# Patient Record
Sex: FEMALE | Race: BLACK OR AFRICAN AMERICAN | NOT HISPANIC OR LATINO
[De-identification: names, ages, dates, MRNs, and addresses within clinical notes are randomized per-mention and may not be internally consistent; named-entity substitution may affect disease eponyms.]

---

## 2017-06-02 ENCOUNTER — LABORATORY RESULT (OUTPATIENT)
Age: 47
End: 2017-06-02

## 2017-06-02 ENCOUNTER — APPOINTMENT (OUTPATIENT)
Dept: INTERNAL MEDICINE | Facility: HOSPITAL | Age: 47
End: 2017-06-02

## 2017-06-02 ENCOUNTER — OUTPATIENT (OUTPATIENT)
Dept: OUTPATIENT SERVICES | Facility: HOSPITAL | Age: 47
LOS: 1 days | End: 2017-06-02

## 2017-06-02 VITALS
RESPIRATION RATE: 14 BRPM | SYSTOLIC BLOOD PRESSURE: 126 MMHG | TEMPERATURE: 97.9 F | HEART RATE: 115 BPM | DIASTOLIC BLOOD PRESSURE: 94 MMHG

## 2017-06-02 VITALS — WEIGHT: 293 LBS | BODY MASS INDEX: 44.41 KG/M2 | HEIGHT: 68 IN

## 2017-06-02 DIAGNOSIS — E11.9 TYPE 2 DIABETES MELLITUS W/OUT COMPLICATIONS: ICD-10-CM

## 2017-06-02 DIAGNOSIS — J32.9 CHRONIC SINUSITIS, UNSPECIFIED: ICD-10-CM

## 2017-06-02 LAB
ALBUMIN SERPL ELPH-MCNC: 4.2 G/DL — SIGNIFICANT CHANGE UP (ref 3.3–5)
ALP SERPL-CCNC: 78 U/L — SIGNIFICANT CHANGE UP (ref 40–120)
ALT FLD-CCNC: 14 U/L — SIGNIFICANT CHANGE UP (ref 4–33)
AST SERPL-CCNC: 13 U/L — SIGNIFICANT CHANGE UP (ref 4–32)
BASOPHILS # BLD AUTO: 0.02 K/UL — SIGNIFICANT CHANGE UP (ref 0–0.2)
BASOPHILS NFR BLD AUTO: 0.2 % — SIGNIFICANT CHANGE UP (ref 0–2)
BILIRUB SERPL-MCNC: 0.3 MG/DL — SIGNIFICANT CHANGE UP (ref 0.2–1.2)
BUN SERPL-MCNC: 8 MG/DL — SIGNIFICANT CHANGE UP (ref 7–23)
CALCIUM SERPL-MCNC: 9.3 MG/DL — SIGNIFICANT CHANGE UP (ref 8.4–10.5)
CHLORIDE SERPL-SCNC: 96 MMOL/L — LOW (ref 98–107)
CHOLEST SERPL-MCNC: 213 MG/DL — HIGH (ref 120–199)
CO2 SERPL-SCNC: 25 MMOL/L — SIGNIFICANT CHANGE UP (ref 22–31)
CREAT SERPL-MCNC: 0.8 MG/DL — SIGNIFICANT CHANGE UP (ref 0.5–1.3)
EOSINOPHIL # BLD AUTO: 0.2 K/UL — SIGNIFICANT CHANGE UP (ref 0–0.5)
EOSINOPHIL NFR BLD AUTO: 1.6 % — SIGNIFICANT CHANGE UP (ref 0–6)
GLUCOSE SERPL-MCNC: 155 MG/DL — HIGH (ref 70–99)
HBA1C BLD-MCNC: 7.3 % — HIGH (ref 4–5.6)
HCT VFR BLD CALC: 37 % — SIGNIFICANT CHANGE UP (ref 34.5–45)
HDLC SERPL-MCNC: 52 MG/DL — SIGNIFICANT CHANGE UP (ref 45–65)
HGB BLD-MCNC: 11.6 G/DL — SIGNIFICANT CHANGE UP (ref 11.5–15.5)
IMM GRANULOCYTES NFR BLD AUTO: 0.3 % — SIGNIFICANT CHANGE UP (ref 0–1.5)
LIPID PNL WITH DIRECT LDL SERPL: 135 MG/DL — SIGNIFICANT CHANGE UP
LYMPHOCYTES # BLD AUTO: 29.4 % — SIGNIFICANT CHANGE UP (ref 13–44)
LYMPHOCYTES # BLD AUTO: 3.59 K/UL — HIGH (ref 1–3.3)
MCHC RBC-ENTMCNC: 25.8 PG — LOW (ref 27–34)
MCHC RBC-ENTMCNC: 31.4 % — LOW (ref 32–36)
MCV RBC AUTO: 82.4 FL — SIGNIFICANT CHANGE UP (ref 80–100)
MONOCYTES # BLD AUTO: 0.55 K/UL — SIGNIFICANT CHANGE UP (ref 0–0.9)
MONOCYTES NFR BLD AUTO: 4.5 % — SIGNIFICANT CHANGE UP (ref 2–14)
NEUTROPHILS # BLD AUTO: 7.8 K/UL — HIGH (ref 1.8–7.4)
NEUTROPHILS NFR BLD AUTO: 64 % — SIGNIFICANT CHANGE UP (ref 43–77)
PLATELET # BLD AUTO: 419 K/UL — HIGH (ref 150–400)
PMV BLD: 9.4 FL — SIGNIFICANT CHANGE UP (ref 7–13)
POTASSIUM SERPL-MCNC: 3.6 MMOL/L — SIGNIFICANT CHANGE UP (ref 3.5–5.3)
POTASSIUM SERPL-SCNC: 3.6 MMOL/L — SIGNIFICANT CHANGE UP (ref 3.5–5.3)
PROT SERPL-MCNC: 8.4 G/DL — HIGH (ref 6–8.3)
RBC # BLD: 4.49 M/UL — SIGNIFICANT CHANGE UP (ref 3.8–5.2)
RBC # FLD: 14.6 % — HIGH (ref 10.3–14.5)
SODIUM SERPL-SCNC: 138 MMOL/L — SIGNIFICANT CHANGE UP (ref 135–145)
TRIGL SERPL-MCNC: 111 MG/DL — SIGNIFICANT CHANGE UP (ref 10–149)
TSH SERPL-MCNC: 0.89 UIU/ML — SIGNIFICANT CHANGE UP (ref 0.27–4.2)
WBC # BLD: 12.2 K/UL — HIGH (ref 3.8–10.5)
WBC # FLD AUTO: 12.2 K/UL — HIGH (ref 3.8–10.5)

## 2017-06-05 DIAGNOSIS — J32.9 CHRONIC SINUSITIS, UNSPECIFIED: ICD-10-CM

## 2017-06-05 DIAGNOSIS — E11.9 TYPE 2 DIABETES MELLITUS WITHOUT COMPLICATIONS: ICD-10-CM

## 2017-06-05 DIAGNOSIS — I10 ESSENTIAL (PRIMARY) HYPERTENSION: ICD-10-CM

## 2017-06-05 DIAGNOSIS — T78.3XXA ANGIONEUROTIC EDEMA, INITIAL ENCOUNTER: ICD-10-CM

## 2017-06-06 DIAGNOSIS — Z00.00 ENCOUNTER FOR GENERAL ADULT MEDICAL EXAMINATION WITHOUT ABNORMAL FINDINGS: ICD-10-CM

## 2017-06-06 LAB
HGB A MFR BLD: 97.4 % — SIGNIFICANT CHANGE UP
HGB A2 MFR BLD: 2.4 % — SIGNIFICANT CHANGE UP (ref 2.4–3.5)
HGB ELECT COMMENT: SIGNIFICANT CHANGE UP
HGB F MFR BLD: < 1 % — SIGNIFICANT CHANGE UP (ref 0–1.5)

## 2017-06-07 LAB — GLUCOSE BLDC GLUCOMTR-MCNC: 100

## 2017-06-20 ENCOUNTER — APPOINTMENT (OUTPATIENT)
Dept: INTERNAL MEDICINE | Facility: HOSPITAL | Age: 47
End: 2017-06-20

## 2017-07-18 ENCOUNTER — RX RENEWAL (OUTPATIENT)
Age: 47
End: 2017-07-18

## 2017-07-24 ENCOUNTER — APPOINTMENT (OUTPATIENT)
Dept: OBGYN | Facility: HOSPITAL | Age: 47
End: 2017-07-24

## 2017-08-16 ENCOUNTER — APPOINTMENT (OUTPATIENT)
Dept: OBGYN | Facility: HOSPITAL | Age: 47
End: 2017-08-16

## 2017-08-29 ENCOUNTER — OTHER (OUTPATIENT)
Age: 47
End: 2017-08-29

## 2017-08-30 RX ORDER — METFORMIN HYDROCHLORIDE 500 MG/1
500 TABLET, COATED ORAL TWICE DAILY
Qty: 180 | Refills: 2 | Status: ACTIVE | COMMUNITY
Start: 2017-06-02 | End: 1900-01-01

## 2017-10-03 ENCOUNTER — APPOINTMENT (OUTPATIENT)
Dept: INTERNAL MEDICINE | Facility: HOSPITAL | Age: 47
End: 2017-10-03

## 2017-10-04 ENCOUNTER — APPOINTMENT (OUTPATIENT)
Dept: OBGYN | Facility: HOSPITAL | Age: 47
End: 2017-10-04

## 2017-10-19 ENCOUNTER — APPOINTMENT (OUTPATIENT)
Dept: OBGYN | Facility: HOSPITAL | Age: 47
End: 2017-10-19

## 2017-11-08 ENCOUNTER — APPOINTMENT (OUTPATIENT)
Dept: OPHTHALMOLOGY | Facility: CLINIC | Age: 47
End: 2017-11-08

## 2017-11-09 ENCOUNTER — APPOINTMENT (OUTPATIENT)
Dept: OBGYN | Facility: HOSPITAL | Age: 47
End: 2017-11-09

## 2017-11-17 ENCOUNTER — APPOINTMENT (OUTPATIENT)
Dept: MAMMOGRAPHY | Facility: IMAGING CENTER | Age: 47
End: 2017-11-17
Payer: MEDICAID

## 2017-11-17 ENCOUNTER — OUTPATIENT (OUTPATIENT)
Dept: OUTPATIENT SERVICES | Facility: HOSPITAL | Age: 47
LOS: 1 days | End: 2017-11-17
Payer: COMMERCIAL

## 2017-11-17 DIAGNOSIS — Z00.8 ENCOUNTER FOR OTHER GENERAL EXAMINATION: ICD-10-CM

## 2017-11-17 PROCEDURE — 77063 BREAST TOMOSYNTHESIS BI: CPT | Mod: 26

## 2017-11-17 PROCEDURE — 77063 BREAST TOMOSYNTHESIS BI: CPT

## 2017-11-17 PROCEDURE — 77067 SCR MAMMO BI INCL CAD: CPT

## 2017-11-17 PROCEDURE — G0202: CPT | Mod: 26

## 2017-12-07 ENCOUNTER — APPOINTMENT (OUTPATIENT)
Dept: OBGYN | Facility: HOSPITAL | Age: 47
End: 2017-12-07

## 2018-01-10 ENCOUNTER — APPOINTMENT (OUTPATIENT)
Dept: OPHTHALMOLOGY | Facility: CLINIC | Age: 48
End: 2018-01-10

## 2019-07-06 ENCOUNTER — APPOINTMENT (OUTPATIENT)
Dept: MAMMOGRAPHY | Facility: IMAGING CENTER | Age: 49
End: 2019-07-06

## 2019-10-19 ENCOUNTER — APPOINTMENT (OUTPATIENT)
Dept: MAMMOGRAPHY | Facility: IMAGING CENTER | Age: 49
End: 2019-10-19
Payer: COMMERCIAL

## 2019-10-19 ENCOUNTER — OUTPATIENT (OUTPATIENT)
Dept: OUTPATIENT SERVICES | Facility: HOSPITAL | Age: 49
LOS: 1 days | End: 2019-10-19
Payer: COMMERCIAL

## 2019-10-19 DIAGNOSIS — Z00.8 ENCOUNTER FOR OTHER GENERAL EXAMINATION: ICD-10-CM

## 2019-10-19 PROCEDURE — 77067 SCR MAMMO BI INCL CAD: CPT | Mod: 26

## 2019-10-19 PROCEDURE — 77067 SCR MAMMO BI INCL CAD: CPT

## 2019-10-19 PROCEDURE — 77063 BREAST TOMOSYNTHESIS BI: CPT

## 2019-10-19 PROCEDURE — 77063 BREAST TOMOSYNTHESIS BI: CPT | Mod: 26

## 2021-04-24 ENCOUNTER — APPOINTMENT (OUTPATIENT)
Dept: MAMMOGRAPHY | Facility: IMAGING CENTER | Age: 51
End: 2021-04-24
Payer: COMMERCIAL

## 2021-04-24 ENCOUNTER — OUTPATIENT (OUTPATIENT)
Dept: OUTPATIENT SERVICES | Facility: HOSPITAL | Age: 51
LOS: 1 days | End: 2021-04-24
Payer: COMMERCIAL

## 2021-04-24 DIAGNOSIS — Z00.8 ENCOUNTER FOR OTHER GENERAL EXAMINATION: ICD-10-CM

## 2021-04-24 PROCEDURE — 77067 SCR MAMMO BI INCL CAD: CPT

## 2021-04-24 PROCEDURE — 77063 BREAST TOMOSYNTHESIS BI: CPT

## 2021-04-24 PROCEDURE — 77063 BREAST TOMOSYNTHESIS BI: CPT | Mod: 26

## 2021-04-24 PROCEDURE — 77067 SCR MAMMO BI INCL CAD: CPT | Mod: 26

## 2021-04-30 ENCOUNTER — RESULT REVIEW (OUTPATIENT)
Age: 51
End: 2021-04-30

## 2021-05-21 ENCOUNTER — APPOINTMENT (OUTPATIENT)
Dept: ULTRASOUND IMAGING | Facility: IMAGING CENTER | Age: 51
End: 2021-05-21

## 2021-05-22 ENCOUNTER — APPOINTMENT (OUTPATIENT)
Dept: ULTRASOUND IMAGING | Facility: IMAGING CENTER | Age: 51
End: 2021-05-22
Payer: COMMERCIAL

## 2021-05-22 ENCOUNTER — OUTPATIENT (OUTPATIENT)
Dept: OUTPATIENT SERVICES | Facility: HOSPITAL | Age: 51
LOS: 1 days | End: 2021-05-22
Payer: COMMERCIAL

## 2021-05-22 DIAGNOSIS — Z00.8 ENCOUNTER FOR OTHER GENERAL EXAMINATION: ICD-10-CM

## 2021-05-22 PROCEDURE — 76830 TRANSVAGINAL US NON-OB: CPT

## 2021-05-22 PROCEDURE — 76830 TRANSVAGINAL US NON-OB: CPT | Mod: 26

## 2022-01-05 ENCOUNTER — APPOINTMENT (OUTPATIENT)
Dept: PEDIATRIC ALLERGY IMMUNOLOGY | Facility: CLINIC | Age: 52
End: 2022-01-05
Payer: COMMERCIAL

## 2022-01-05 VITALS
HEART RATE: 76 BPM | DIASTOLIC BLOOD PRESSURE: 85 MMHG | OXYGEN SATURATION: 98 % | HEIGHT: 68 IN | SYSTOLIC BLOOD PRESSURE: 143 MMHG | WEIGHT: 293 LBS | BODY MASS INDEX: 44.41 KG/M2

## 2022-01-05 DIAGNOSIS — T78.3XXA ANGIONEUROTIC EDEMA, INITIAL ENCOUNTER: ICD-10-CM

## 2022-01-05 DIAGNOSIS — I10 ESSENTIAL (PRIMARY) HYPERTENSION: ICD-10-CM

## 2022-01-05 PROCEDURE — 36415 COLL VENOUS BLD VENIPUNCTURE: CPT | Mod: GC

## 2022-01-05 PROCEDURE — 99204 OFFICE O/P NEW MOD 45 MIN: CPT | Mod: 25,GC

## 2022-01-05 RX ORDER — EPINEPHRINE 0.3 MG/.3ML
0.3 INJECTION INTRAMUSCULAR
Qty: 2 | Refills: 0 | Status: ACTIVE | COMMUNITY
Start: 2022-01-05 | End: 1900-01-01

## 2022-01-05 RX ORDER — CETIRIZINE HYDROCHLORIDE 10 MG/1
10 TABLET, COATED ORAL
Qty: 60 | Refills: 5 | Status: ACTIVE | COMMUNITY
Start: 2022-01-05 | End: 1900-01-01

## 2022-01-06 LAB
ALBUMIN SERPL ELPH-MCNC: 4.7 G/DL
ALP BLD-CCNC: 128 U/L
ALT SERPL-CCNC: 25 U/L
ANION GAP SERPL CALC-SCNC: 15 MMOL/L
AST SERPL-CCNC: 13 U/L
BASOPHILS # BLD AUTO: 0.03 K/UL
BASOPHILS NFR BLD AUTO: 0.2 %
BILIRUB SERPL-MCNC: 0.3 MG/DL
BUN SERPL-MCNC: 10 MG/DL
CALCIUM SERPL-MCNC: 10 MG/DL
CHLORIDE SERPL-SCNC: 98 MMOL/L
CO2 SERPL-SCNC: 26 MMOL/L
CREAT SERPL-MCNC: 0.64 MG/DL
EOSINOPHIL # BLD AUTO: 0.12 K/UL
EOSINOPHIL NFR BLD AUTO: 0.9 %
ERYTHROCYTE [SEDIMENTATION RATE] IN BLOOD BY WESTERGREN METHOD: 38 MM/HR
GLUCOSE SERPL-MCNC: 108 MG/DL
HCT VFR BLD CALC: 43.4 %
HGB BLD-MCNC: 13.1 G/DL
IMM GRANULOCYTES NFR BLD AUTO: 0.3 %
LYMPHOCYTES # BLD AUTO: 3.62 K/UL
LYMPHOCYTES NFR BLD AUTO: 26.8 %
MAN DIFF?: NORMAL
MCHC RBC-ENTMCNC: 26 PG
MCHC RBC-ENTMCNC: 30.2 GM/DL
MCV RBC AUTO: 86.3 FL
MONOCYTES # BLD AUTO: 0.77 K/UL
MONOCYTES NFR BLD AUTO: 5.7 %
NEUTROPHILS # BLD AUTO: 8.95 K/UL
NEUTROPHILS NFR BLD AUTO: 66.1 %
PLATELET # BLD AUTO: 388 K/UL
POTASSIUM SERPL-SCNC: 3.8 MMOL/L
PROT SERPL-MCNC: 8.2 G/DL
RBC # BLD: 5.03 M/UL
RBC # FLD: 14.5 %
SODIUM SERPL-SCNC: 139 MMOL/L
T4 FREE SERPL-MCNC: 1.2 NG/DL
TSH SERPL-ACNC: 1.02 UIU/ML
WBC # FLD AUTO: 13.53 K/UL

## 2022-01-10 ENCOUNTER — LABORATORY RESULT (OUTPATIENT)
Age: 52
End: 2022-01-10

## 2022-01-21 LAB
C1INH FUNCTIONAL FLD-MCNC: 89
C1INH SERPL-MCNC: 40 MG/DL
C4 SERPL-MCNC: 40 MG/DL
CHRONIC URTICARIA PANEL (CU INDEX): 5
THYROGLOB AB SERPL-ACNC: 45.6 IU/ML
THYROPEROXIDASE AB SERPL IA-ACNC: 453 IU/ML
TRYPTASE: 3.7 UG/L
TSH RECEPTOR AB: <1.1 IU/L

## 2022-01-23 PROBLEM — T78.3XXA ANGIOEDEMA: Status: ACTIVE | Noted: 2017-06-02

## 2022-01-23 NOTE — PHYSICAL EXAM
[Alert] : alert [Well Nourished] : well nourished Depth Of Biopsy: dermis [Healthy Appearance] : healthy appearance [No Acute Distress] : no acute distress [Well Developed] : well developed [Normal Pupil & Iris Size/Symmetry] : normal pupil and iris size and symmetry [No Discharge] : no discharge [No Photophobia] : no photophobia [Sclera Not Icteric] : sclera not icteric [Normal TMs] : both tympanic membranes were normal [Normal Nasal Mucosa] : the nasal mucosa was normal [Normal Lips/Tongue] : the lips and tongue were normal [Normal Outer Ear/Nose] : the ears and nose were normal in appearance [Normal Tonsils] : normal tonsils [No Thrush] : no thrush [Supple] : the neck was supple [Normal Rate and Effort] : normal respiratory rhythm and effort [No Crackles] : no crackles [No Retractions] : no retractions [Bilateral Audible Breath Sounds] : bilateral audible breath sounds [Normal Rate] : heart rate was normal  [Normal S1, S2] : normal S1 and S2 [No murmur] : no murmur [Regular Rhythm] : with a regular rhythm [Soft] : abdomen soft [Not Tender] : non-tender [Not Distended] : not distended [No HSM] : no hepato-splenomegaly [Normal Cervical Lymph Nodes] : cervical [Skin Intact] : skin intact  [No Rash] : no rash [No Skin Lesions] : no skin lesions [No clubbing] : no clubbing [No Edema] : no edema [No Cyanosis] : no cyanosis [Normal Mood] : mood was normal [Normal Affect] : affect was normal [Alert, Awake, Oriented as Age-Appropriate] : alert, awake, oriented as age appropriate [Pale mucosa] : no pale mucosa [de-identified] : obese

## 2022-01-23 NOTE — HISTORY OF PRESENT ILLNESS
[de-identified] : Beto is a 51 year old woman with urticaria and angioedema who presents for initial allergy evaluation. \par \par Hx of asthma\par She has taken albuterol and inhaled steroid\par \par Hx of chronic urticaria and angioedema \par Episodes of urticaria and angioedema for the past 3 years - has an epipen.\shelton USed epipen a few night ago for diffuse hives, weakness, difficulty breathing, and lip swelling, which helped. Went to ED last Friday where they sent her home with prednisone to manage angioedema.\shelton Also had hives and had chest tightness. Hives worsen with itching\par No known pattern or triggers for the episodes, will wake up in the middle of the night with tongue swelling\shelton Does not get asthma symptoms unless she has urticaria and angioedema. Takes 1 zyrtec daily.\shelton Also has near-syncope "all the time", has blacked out 3x. Also frequently has hot flashes and headaches but was evaluated by her OB/GYN, who said that she is in menopause. Is up to date on her mammogram screenings and pap smears, all have been normal thus far. Has NOT yet had a colonoscopy.\par Other medical problems include diabetes, HTN, hyperlipidemia. \par Possible rheumatoid arthritis in her sister but no other known autoimmune disorders in family history.\par Medications include: metformin, farxiga, atorvastatin, amlodipine, zyrtec, aspirin (supposed to take this per her PCP but has not been taking) \par \par Dairy and chocolate tend to exacerbate onset of symptoms but will happen in the absence of these as well

## 2022-01-23 NOTE — CONSULT LETTER
[Dear  ___] : Dear  [unfilled], [Consult Letter:] : I had the pleasure of evaluating your patient, [unfilled]. [Please see my note below.] : Please see my note below. [Consult Closing:] : Thank you very much for allowing me to participate in the care of this patient.  If you have any questions, please do not hesitate to contact me. [Sincerely,] : Sincerely, [FreeTextEntry2] : Maykel Levine MD [FreeTextEntry3] : Lucia Ordonez MD\par Attending Physician \par Division of Allergy/Immunology \par [facility] \par \par  of Medicine and Pediatrics\par [facility] \par \par [address]\[place] [address]\par Tel: [phone]\par Fax: [phone]\par Email: [email or web address]\par \par \par \par

## 2022-01-28 LAB — IGE RECEPTOR AB: NORMAL

## 2022-03-14 ENCOUNTER — APPOINTMENT (OUTPATIENT)
Dept: VASCULAR SURGERY | Facility: CLINIC | Age: 52
End: 2022-03-14
Payer: COMMERCIAL

## 2022-03-14 VITALS
SYSTOLIC BLOOD PRESSURE: 138 MMHG | WEIGHT: 293 LBS | HEART RATE: 73 BPM | HEIGHT: 68 IN | BODY MASS INDEX: 44.41 KG/M2 | TEMPERATURE: 96.8 F | DIASTOLIC BLOOD PRESSURE: 86 MMHG

## 2022-03-14 PROCEDURE — 93922 UPR/L XTREMITY ART 2 LEVELS: CPT

## 2022-03-14 PROCEDURE — 99203 OFFICE O/P NEW LOW 30 MIN: CPT | Mod: 25

## 2022-03-14 PROCEDURE — 29580 STRAPPING UNNA BOOT: CPT | Mod: LT

## 2022-03-14 PROCEDURE — 93970 EXTREMITY STUDY: CPT

## 2022-03-17 NOTE — HISTORY OF PRESENT ILLNESS
[FreeTextEntry1] : Mrs. Lui Hurst is a jammie 51 year old lady who presents with a 1 month history of left medial ankle wound. She reports a history of a wound in the same location, which occurred following injury in 2007. The wound took two years to heal completely. This was managed with Unna boots at the time. She has been applying Unna boots herself once/week. She denies any history of DVT or SVT. She reports cramping in the left leg which occurs after ambulating 1-2 blocks. She reports pain in the leg at night. She denies any history of right sided symptoms. She has previously worn compression stockings. She has not had any prior vascular surgical intervention on her lower extremities. 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

## 2022-03-17 NOTE — PHYSICAL EXAM
[Normal Breath Sounds] : Normal breath sounds [Normal Heart Sounds] : normal heart sounds [2+] : left 2+ [de-identified] : NAD. Neurologically intact.  [de-identified] : WNL. [FreeTextEntry1] : Left medial leg ulcer 5x3cm. Clean, no signs of infection.

## 2022-03-21 ENCOUNTER — APPOINTMENT (OUTPATIENT)
Dept: VASCULAR SURGERY | Facility: CLINIC | Age: 52
End: 2022-03-21
Payer: COMMERCIAL

## 2022-03-21 VITALS
DIASTOLIC BLOOD PRESSURE: 82 MMHG | HEART RATE: 81 BPM | SYSTOLIC BLOOD PRESSURE: 129 MMHG | WEIGHT: 293 LBS | BODY MASS INDEX: 43.4 KG/M2 | TEMPERATURE: 95.7 F | HEIGHT: 69 IN

## 2022-03-21 PROCEDURE — 29580 STRAPPING UNNA BOOT: CPT

## 2022-03-21 PROCEDURE — 99213 OFFICE O/P EST LOW 20 MIN: CPT | Mod: 25

## 2022-03-21 NOTE — ASSESSMENT
[FreeTextEntry1] : In summary, Mrs. Lui Hurst presents with left medial ankle ulcer. The unna boot dressing has moisture through the dressing, therefore we will change to twice weekly dressing changes (first one performed today). Fibrinous tissue was debrided from wound prior to dressing change. She will return on 3/24 for dressing change with our NP Julissa and on 3/28 for dressing change with me.

## 2022-03-21 NOTE — PHYSICAL EXAM
[Normal Breath Sounds] : Normal breath sounds [Normal Heart Sounds] : normal heart sounds [2+] : left 2+ [de-identified] : NAD. Neurologically intact.  [de-identified] : WNL. [FreeTextEntry1] : Left medial leg ulcer 5x3cm. Clean, no signs of infection. More granulation tissue present today.

## 2022-03-21 NOTE — HISTORY OF PRESENT ILLNESS
[FreeTextEntry1] : Mrs. Lui Hurst is a jammie 51 year old lady who presents with a 1 month history of left medial ankle wound. She reports a history of a wound in the same location, which occurred following injury in 2007. The wound took two years to heal completely. This was managed with Unna boots at the time. She has been applying Unna boots herself once/week. She denies any history of DVT or SVT. She reports cramping in the left leg which occurs after ambulating 1-2 blocks. She reports pain in the leg at night. She denies any history of right sided symptoms. She has previously worn compression stockings. She has not had any prior vascular surgical intervention on her lower extremities. 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

## 2022-03-24 ENCOUNTER — APPOINTMENT (OUTPATIENT)
Dept: VASCULAR SURGERY | Facility: CLINIC | Age: 52
End: 2022-03-24
Payer: COMMERCIAL

## 2022-03-24 PROCEDURE — 29580 STRAPPING UNNA BOOT: CPT | Mod: LT

## 2022-03-24 RX ORDER — ATORVASTATIN CALCIUM 20 MG/1
20 TABLET, FILM COATED ORAL
Qty: 90 | Refills: 0 | Status: ACTIVE | COMMUNITY
Start: 2021-04-09

## 2022-03-24 RX ORDER — ALBUTEROL SULFATE 90 UG/1
108 (90 BASE) INHALANT RESPIRATORY (INHALATION)
Qty: 7 | Refills: 0 | Status: ACTIVE | COMMUNITY
Start: 2021-04-17

## 2022-03-24 RX ORDER — FAMOTIDINE 20 MG/1
20 TABLET, FILM COATED ORAL
Qty: 60 | Refills: 0 | Status: ACTIVE | COMMUNITY
Start: 2021-11-09

## 2022-03-24 RX ORDER — AMOXICILLIN AND CLAVULANATE POTASSIUM 875; 125 MG/1; MG/1
875-125 TABLET, COATED ORAL
Qty: 14 | Refills: 0 | Status: ACTIVE | COMMUNITY
Start: 2022-02-22 | End: 1900-01-01

## 2022-03-24 RX ORDER — SULFAMETHOXAZOLE AND TRIMETHOPRIM 800; 160 MG/1; MG/1
800-160 TABLET ORAL
Qty: 14 | Refills: 0 | Status: ACTIVE | COMMUNITY
Start: 2022-02-18

## 2022-03-24 RX ORDER — DAPAGLIFLOZIN 5 MG/1
5 TABLET, FILM COATED ORAL
Qty: 90 | Refills: 0 | Status: ACTIVE | COMMUNITY
Start: 2021-09-16

## 2022-03-24 RX ORDER — HYDRALAZINE HYDROCHLORIDE 10 MG/1
10 TABLET ORAL
Qty: 90 | Refills: 0 | Status: ACTIVE | COMMUNITY
Start: 2022-03-17

## 2022-03-24 RX ORDER — PREDNISONE 20 MG/1
20 TABLET ORAL
Qty: 60 | Refills: 0 | Status: ACTIVE | COMMUNITY
Start: 2021-11-09

## 2022-03-24 RX ORDER — DULAGLUTIDE 1.5 MG/.5ML
1.5 INJECTION, SOLUTION SUBCUTANEOUS
Qty: 6 | Refills: 0 | Status: ACTIVE | COMMUNITY
Start: 2022-02-22

## 2022-03-24 RX ORDER — DULAGLUTIDE 0.75 MG/.5ML
0.75 INJECTION, SOLUTION SUBCUTANEOUS
Qty: 2 | Refills: 0 | Status: ACTIVE | COMMUNITY
Start: 2022-02-07

## 2022-03-24 NOTE — PHYSICAL EXAM
[Normal Breath Sounds] : Normal breath sounds [Normal Heart Sounds] : normal heart sounds [] : present [2+] : left 2+ [Ankle Swelling On The Left] : moderate [Alert] : alert [Oriented to Person] : oriented to person [Oriented to Place] : oriented to place [Oriented to Time] : oriented to time [Calm] : calm [de-identified] : NAD [de-identified] : WNL. [FreeTextEntry1] : Left medial leg ulcer 5cm x 4cm w/ granulation tissue. Two small areas w/ fibrin firmly attached to base. Mild serous drainage. Surrounding periwound area w/ erythema and warmth to the touch.  [de-identified] : central obesity

## 2022-03-24 NOTE — ASSESSMENT
[Arterial/Venous Disease] : arterial/venous disease [Other: _____] : [unfilled] [Foot care/Footwear] : foot care/footwear [Ulcer Care] : ulcer care [FreeTextEntry1] : Venous Insufficiency w/ left medial malleolar ulcer w/ mild cellulitis\par \par Medical Conservative Management leg elevation, encouraged diet and weight loss, compression stockings to right leg\par Start Augmentin 875/125mg PO BID x 7 days\par Continue left leg unna boot 2x week until wound completely heals\par RTO on Monday for unna boot change

## 2022-03-24 NOTE — HISTORY OF PRESENT ILLNESS
[FreeTextEntry1] :  50 y/o f who presents with a 1 month history of left medial ankle wound. She also had a wound in the same location years ago which eventually healed. She is tolerating a left leg unna boot and last visit the frequency of changes was increases to 2x week d/t drainage. She denies any history of DVT or SVT. She reports pain in the leg at night w/ associated burning. Taking ibuprofen. Denies fever or chills.

## 2022-03-24 NOTE — PROCEDURE
[FreeTextEntry1] : Left leg wound cleansed w/ Chlorhexidine solution\par Left unna boot re applied from toes to below knee, foam used for absorbency outside the unna boot

## 2022-03-28 ENCOUNTER — APPOINTMENT (OUTPATIENT)
Dept: VASCULAR SURGERY | Facility: CLINIC | Age: 52
End: 2022-03-28
Payer: COMMERCIAL

## 2022-03-28 VITALS
HEART RATE: 69 BPM | BODY MASS INDEX: 30.36 KG/M2 | WEIGHT: 205 LBS | SYSTOLIC BLOOD PRESSURE: 135 MMHG | TEMPERATURE: 97.3 F | HEIGHT: 69 IN | DIASTOLIC BLOOD PRESSURE: 76 MMHG

## 2022-03-28 PROCEDURE — 29580 STRAPPING UNNA BOOT: CPT | Mod: LT

## 2022-03-29 NOTE — ASSESSMENT
[FreeTextEntry1] : In summary, Mrs. Lui Hurst presents for left leg unna boot dressing change, which was performed. She will follow up at the end of the week for the next dressing change.

## 2022-03-29 NOTE — PHYSICAL EXAM
[Normal Breath Sounds] : Normal breath sounds [Normal Heart Sounds] : normal heart sounds [2+] : left 2+ [de-identified] : NAD. Neurologically intact.  [de-identified] : WNL. [FreeTextEntry1] : Left medial leg ulcer 5x3cm. Clean, no signs of infection. More granulation tissue present today.

## 2022-03-29 NOTE — HISTORY OF PRESENT ILLNESS
[FreeTextEntry1] : Mrs. Lui Hurst is a jammie 51 year old lady who presented with a 1 month history of left medial ankle wound. She reported a history of a wound in the same location, which occurred following injury in 2007. The wound took two years to heal completely. This was managed with Unna boots at the time. She had been applying Unna boots herself once/week. She denied any history of DVT or SVT. She reported cramping in the left leg which occurs after ambulating 1-2 blocks. She reported pain in the leg at night. She denied any history of right sided symptoms. She had previously worn compression stockings. She has not had any prior vascular surgical intervention on her lower extremities. Following her initial evaluation, we initiated twice weekly Unna boot dressings and she presents today for dressing change. 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

## 2022-03-31 ENCOUNTER — APPOINTMENT (OUTPATIENT)
Dept: VASCULAR SURGERY | Facility: CLINIC | Age: 52
End: 2022-03-31
Payer: COMMERCIAL

## 2022-03-31 VITALS
HEIGHT: 69 IN | BODY MASS INDEX: 43.4 KG/M2 | SYSTOLIC BLOOD PRESSURE: 138 MMHG | DIASTOLIC BLOOD PRESSURE: 83 MMHG | WEIGHT: 293 LBS | HEART RATE: 82 BPM | TEMPERATURE: 98.3 F

## 2022-03-31 PROCEDURE — 29580 STRAPPING UNNA BOOT: CPT | Mod: LT

## 2022-03-31 NOTE — ASSESSMENT
[Arterial/Venous Disease] : arterial/venous disease [Other: _____] : [unfilled] [Foot care/Footwear] : foot care/footwear [Ulcer Care] : ulcer care [FreeTextEntry1] : Venous Insufficiency w/ left medial malleolar ulcer w/ cellulitis resolved\par \par Medical Conservative Management leg elevation, encouraged diet and weight loss, compression stockings to right leg\par Continue left leg unna boot 2x week until wound completely heals\par RTO on Monday for unna boot change

## 2022-03-31 NOTE — PHYSICAL EXAM
[Normal Breath Sounds] : Normal breath sounds [Normal Heart Sounds] : normal heart sounds [2+] : left 2+ [Varicose Veins Of Lower Extremities] : present [] : present [Ankle Swelling On The Left] : moderate [Alert] : alert [Oriented to Person] : oriented to person [Oriented to Place] : oriented to place [Oriented to Time] : oriented to time [Calm] : calm [de-identified] : NAD [de-identified] : WNL. [FreeTextEntry1] : Left medial leg ulcer approx 5cm x 4cm w/ more granulation tissue. Mild-Mod serous drainage. Surrounding periwound area erythema completely resolved.  [de-identified] : central obesity  [de-identified] : SANTOSL

## 2022-03-31 NOTE — HISTORY OF PRESENT ILLNESS
[FreeTextEntry1] :  50 y/o f who presents with a 1 month history of left medial ankle wound. She also had a wound in the same location years ago which eventually healed. She is tolerating a left leg unna boot and last visit the frequency of changes was increases to 2x week d/t drainage. She denies any history of DVT or SVT. She reports pain in the leg at night w/ associated burning. Taking ibuprofen. Denies fever or chills. [de-identified] : 3/31/22 pt presents today for unna boot change. She completed the course of Augmentin. Her PCP has started her on Gabapentin for nerve pain related to her wound. Tolerating unna boot without complications. No new complaints from previous visit.

## 2022-04-04 ENCOUNTER — APPOINTMENT (OUTPATIENT)
Dept: VASCULAR SURGERY | Facility: CLINIC | Age: 52
End: 2022-04-04
Payer: COMMERCIAL

## 2022-04-04 VITALS
WEIGHT: 293 LBS | BODY MASS INDEX: 43.4 KG/M2 | SYSTOLIC BLOOD PRESSURE: 158 MMHG | HEART RATE: 80 BPM | DIASTOLIC BLOOD PRESSURE: 93 MMHG | HEIGHT: 69 IN

## 2022-04-04 PROCEDURE — 29580 STRAPPING UNNA BOOT: CPT | Mod: LT

## 2022-04-04 NOTE — PHYSICAL EXAM
[Normal Breath Sounds] : Normal breath sounds [Normal Heart Sounds] : normal heart sounds [2+] : left 2+ [de-identified] : NAD. Neurologically intact.  [de-identified] : WNL. [FreeTextEntry1] : Left medial leg ulcer 5x3cm. Clean, no signs of infection. More granulation tissue present today.

## 2022-04-04 NOTE — HISTORY OF PRESENT ILLNESS
[FreeTextEntry1] : Mrs. Lui Hurst is a jammie 51 year old lady who presented with a 1 month history of left medial ankle wound. She reported a history of a wound in the same location, which occurred following injury in 2007. The wound took two years to heal completely. This was managed with Unna boots at the time. She had been applying Unna boots herself once/week. She denied any history of DVT or SVT. She reported cramping in the left leg which occurs after ambulating 1-2 blocks. She reported pain in the leg at night. She denied any history of right sided symptoms. She had previously worn compression stockings. She has not had any prior vascular surgical intervention on her lower extremities. Following her initial evaluation, we initiated twice weekly Unna boot dressings and she presents today for dressing change. 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

## 2022-04-04 NOTE — ASSESSMENT
[FreeTextEntry1] : In summary, Mrs. Lui Hurst p/w LLE ulcer. Unna boot was applied today. She will return on 4/7/22 for the next dressing change.

## 2022-04-07 ENCOUNTER — APPOINTMENT (OUTPATIENT)
Dept: VASCULAR SURGERY | Facility: CLINIC | Age: 52
End: 2022-04-07
Payer: COMMERCIAL

## 2022-04-07 VITALS
DIASTOLIC BLOOD PRESSURE: 84 MMHG | HEART RATE: 73 BPM | BODY MASS INDEX: 43.4 KG/M2 | WEIGHT: 293 LBS | HEIGHT: 69 IN | TEMPERATURE: 96.6 F | SYSTOLIC BLOOD PRESSURE: 138 MMHG

## 2022-04-07 PROCEDURE — 29580 STRAPPING UNNA BOOT: CPT | Mod: LT

## 2022-04-07 NOTE — PHYSICAL EXAM
[Normal Breath Sounds] : Normal breath sounds [Normal Heart Sounds] : normal heart sounds [2+] : left 2+ [de-identified] : NAD. Neurologically intact.  [de-identified] : WNL. [FreeTextEntry1] : Left medial leg ulcer 5x3cm. Clean, no signs of infection. More granulation tissue present today. 0

## 2022-04-08 NOTE — ASSESSMENT
[FreeTextEntry1] : In summary, Mrs. Poe p/w left medial malleolus wound. NATIVIDAD were obtained in the office today and were:\par Right: 1.17/1.22 (normal waveforms, digit pressure 119) \par Left: 1.1/1.26 (normal waveforms, digit pressure 158)\par \par A venous duplex showed:\par -No evidence of DVT\par -Reflux in bilateral GSV\par \par We will innate treatment with once weekly Unna boots in the office. First boot was applied to th eleft leg today. Once the wound heals, we will proceed with bilateral GSV ablation.  This is a surgical and/or non-medical patient.

## 2022-04-11 ENCOUNTER — APPOINTMENT (OUTPATIENT)
Dept: VASCULAR SURGERY | Facility: CLINIC | Age: 52
End: 2022-04-11
Payer: COMMERCIAL

## 2022-04-11 VITALS
SYSTOLIC BLOOD PRESSURE: 138 MMHG | HEIGHT: 69 IN | HEART RATE: 92 BPM | TEMPERATURE: 96.1 F | WEIGHT: 293 LBS | DIASTOLIC BLOOD PRESSURE: 90 MMHG | BODY MASS INDEX: 43.4 KG/M2

## 2022-04-11 PROCEDURE — 29580 STRAPPING UNNA BOOT: CPT

## 2022-04-11 NOTE — PHYSICAL EXAM
[Normal Breath Sounds] : Normal breath sounds [Normal Heart Sounds] : normal heart sounds [2+] : left 2+ [de-identified] : NAD. Neurologically intact.  [de-identified] : WNL. [FreeTextEntry1] : Left medial leg ulcer 5x3cm. Clean, no signs of infection. More granulation tissue present today. There is maceration of the tissue around the ulcer and the dressing has increased drainage.

## 2022-04-14 ENCOUNTER — APPOINTMENT (OUTPATIENT)
Dept: VASCULAR SURGERY | Facility: CLINIC | Age: 52
End: 2022-04-14
Payer: COMMERCIAL

## 2022-04-14 VITALS
TEMPERATURE: 95.36 F | BODY MASS INDEX: 43.4 KG/M2 | HEIGHT: 69 IN | SYSTOLIC BLOOD PRESSURE: 138 MMHG | WEIGHT: 293 LBS | HEART RATE: 80 BPM | DIASTOLIC BLOOD PRESSURE: 84 MMHG

## 2022-04-14 PROCEDURE — 29580 STRAPPING UNNA BOOT: CPT | Mod: LT

## 2022-04-14 NOTE — PROCEDURE
[FreeTextEntry1] : Left leg wound cleansed w/ Chlorhexidine solution\par No sting barrier film to periwound\par Aquacel Extra cut to size of wound\par Left unna boot re applied from toes to below knee, foam used for absorbency outside the unna boot

## 2022-04-14 NOTE — ASSESSMENT
[Arterial/Venous Disease] : arterial/venous disease [Other: _____] : [unfilled] [Foot care/Footwear] : foot care/footwear [Ulcer Care] : ulcer care [FreeTextEntry1] : Venous Insufficiency w/ left medial malleolar ulcer \par \par Medical Conservative Management leg elevation, encouraged diet and weight loss, compression stockings to right leg\par Continue left leg unna boot 2x week until wound completely heals\par RTO on Monday for unna boot change

## 2022-04-14 NOTE — HISTORY OF PRESENT ILLNESS
[FreeTextEntry1] :  50 y/o f who presents with a 1 month history of left medial ankle wound. She also had a wound in the same location years ago which eventually healed. She is tolerating a left leg unna boot and last visit the frequency of changes was increases to 2x week d/t drainage. She denies any history of DVT or SVT. She reports pain in the leg at night w/ associated burning. Taking ibuprofen. Denies fever or chills. [de-identified] : 4/14/22 pt presents today for unna boot change.  Tolerating unna boot without complications. She reports her pain is better controlled, her gabapentin was increased. Also reports less drainage from wound. No new complaints from previous visit.

## 2022-04-14 NOTE — PHYSICAL EXAM
[Normal Breath Sounds] : Normal breath sounds [Normal Heart Sounds] : normal heart sounds [2+] : left 2+ [Varicose Veins Of Lower Extremities] : present [] : present [Ankle Swelling On The Left] : moderate [Alert] : alert [Oriented to Person] : oriented to person [Oriented to Place] : oriented to place [Oriented to Time] : oriented to time [Calm] : calm [de-identified] : NAD [de-identified] : WNL. [FreeTextEntry1] : Left medial leg ulcer approx 5cm x 4cm w/ more granulation tissue. Mild-Mod serous drainage. No erythema. Periwound maceration is stable now [de-identified] : central obesity  [de-identified] : SANTOSL

## 2022-04-18 ENCOUNTER — APPOINTMENT (OUTPATIENT)
Dept: VASCULAR SURGERY | Facility: CLINIC | Age: 52
End: 2022-04-18
Payer: COMMERCIAL

## 2022-04-18 VITALS
HEART RATE: 84 BPM | BODY MASS INDEX: 43.4 KG/M2 | HEIGHT: 69 IN | SYSTOLIC BLOOD PRESSURE: 141 MMHG | DIASTOLIC BLOOD PRESSURE: 88 MMHG | TEMPERATURE: 97.3 F | WEIGHT: 293 LBS

## 2022-04-18 PROCEDURE — 29580 STRAPPING UNNA BOOT: CPT | Mod: LT

## 2022-04-18 NOTE — PHYSICAL EXAM
[Normal Breath Sounds] : Normal breath sounds [Normal Heart Sounds] : normal heart sounds [2+] : left 2+ [de-identified] : NAD. Neurologically intact.  [de-identified] : WNL. [FreeTextEntry1] : Left medial leg ulcer 5x3cm. Clean, no signs of infection. More granulation tissue present today. There is maceration of the tissue around the ulcer and the dressing has increased drainage.

## 2022-04-21 ENCOUNTER — APPOINTMENT (OUTPATIENT)
Dept: VASCULAR SURGERY | Facility: CLINIC | Age: 52
End: 2022-04-21
Payer: COMMERCIAL

## 2022-04-21 VITALS
HEIGHT: 69 IN | BODY MASS INDEX: 43.4 KG/M2 | HEART RATE: 76 BPM | DIASTOLIC BLOOD PRESSURE: 89 MMHG | TEMPERATURE: 97.2 F | SYSTOLIC BLOOD PRESSURE: 153 MMHG | WEIGHT: 293 LBS

## 2022-04-21 PROCEDURE — 29580 STRAPPING UNNA BOOT: CPT | Mod: LT

## 2022-04-21 NOTE — VITALS
[Burning] : burning [Throbbing] : throbbing [FreeTextEntry3] : left ankle wound [FreeTextEntry2] : cleaning, touching

## 2022-04-21 NOTE — ASSESSMENT
[Arterial/Venous Disease] : arterial/venous disease [Other: _____] : [unfilled] [Foot care/Footwear] : foot care/footwear [Ulcer Care] : ulcer care [FreeTextEntry1] : Venous Insufficiency w/ left medial malleolar ulcer slowly improving\par \par Medical Conservative Management leg elevation, encouraged diet and weight loss, compression stockings to right leg\par Continue left leg unna boot 2x week until wound completely heals\par RTO on Monday for unna boot change

## 2022-04-21 NOTE — HISTORY OF PRESENT ILLNESS
[FreeTextEntry1] :  52 y/o f who presents with a 1 month history of left medial ankle wound. She also had a wound in the same location years ago which eventually healed. She is tolerating a left leg unna boot and last visit the frequency of changes was increases to 2x week d/t drainage. She denies any history of DVT or SVT. She reports pain in the leg at night w/ associated burning. Taking ibuprofen. Denies fever or chills. [de-identified] : 4/20/22 pt presents today for unna boot change. Tolerating LLE unna boot without complications. She reports her pain is better controlled, still taking Tylenol and Gabapentin. Drainage is slightly improved, she is draining through to the kerlix not the ace bandage. No new complaints from previous visit. Denies fever/chills.

## 2022-04-21 NOTE — PHYSICAL EXAM
[Normal Breath Sounds] : Normal breath sounds [Normal Heart Sounds] : normal heart sounds [2+] : left 2+ [Varicose Veins Of Lower Extremities] : present [] : present [Ankle Swelling On The Left] : moderate [Alert] : alert [Oriented to Person] : oriented to person [Oriented to Place] : oriented to place [Oriented to Time] : oriented to time [Calm] : calm [de-identified] : NAD [de-identified] : WNL. [FreeTextEntry1] : Left medial leg ulcer stable in size approx 5cm x 4cm w/ hypergranulation tissue, but filling in. Mild-Mod serous drainage. No erythema or s/s of infection. Periwound maceration is improved. [de-identified] : central obesity  [de-identified] : SANTOSL

## 2022-04-21 NOTE — PROCEDURE
[FreeTextEntry1] : Left leg wound cleansed w/ Chlorhexidine solution\par Silver Nitrate to wound\par No sting barrier film to periwound\par Aquacel Extra cut to size of wound\par Left unna boot re applied from toes to below knee, foam dressing used for absorbency outside the unna boot layer

## 2022-04-25 ENCOUNTER — APPOINTMENT (OUTPATIENT)
Dept: VASCULAR SURGERY | Facility: CLINIC | Age: 52
End: 2022-04-25
Payer: COMMERCIAL

## 2022-04-25 VITALS
HEART RATE: 93 BPM | HEIGHT: 69 IN | WEIGHT: 293 LBS | TEMPERATURE: 96.3 F | BODY MASS INDEX: 43.4 KG/M2 | DIASTOLIC BLOOD PRESSURE: 91 MMHG | SYSTOLIC BLOOD PRESSURE: 138 MMHG

## 2022-04-25 PROCEDURE — 29580 STRAPPING UNNA BOOT: CPT | Mod: LT

## 2022-04-25 NOTE — ASSESSMENT
[FreeTextEntry1] : Left leg Unna boot applied. Silver nitrate to hypergranulation. Aquacel on top of wound. Unna boot with absorbant pad on top and ACE.

## 2022-04-25 NOTE — PHYSICAL EXAM
[Normal Breath Sounds] : Normal breath sounds [Normal Heart Sounds] : normal heart sounds [2+] : left 2+ [de-identified] : NAD. Neurologically intact.  [de-identified] : WNL. [FreeTextEntry1] : Left medial leg ulcer 5x3cm. Clean, no signs of infection. More granulation tissue present today. There is maceration of the tissue around the ulcer and the dressing has increased drainage.

## 2022-04-28 ENCOUNTER — APPOINTMENT (OUTPATIENT)
Dept: VASCULAR SURGERY | Facility: CLINIC | Age: 52
End: 2022-04-28
Payer: COMMERCIAL

## 2022-04-28 VITALS
HEART RATE: 101 BPM | DIASTOLIC BLOOD PRESSURE: 85 MMHG | SYSTOLIC BLOOD PRESSURE: 149 MMHG | BODY MASS INDEX: 43.4 KG/M2 | WEIGHT: 293 LBS | TEMPERATURE: 97.16 F | HEIGHT: 69 IN

## 2022-04-28 PROCEDURE — 29580 STRAPPING UNNA BOOT: CPT | Mod: LT

## 2022-04-28 NOTE — HISTORY OF PRESENT ILLNESS
[FreeTextEntry1] :  52 y/o f who presents with a 1 month history of left medial ankle wound. She also had a wound in the same location years ago which eventually healed. She is tolerating a left leg unna boot and last visit the frequency of changes was increases to 2x week d/t drainage. She denies any history of DVT or SVT. She reports pain in the leg at night w/ associated burning. Taking ibuprofen. Denies fever or chills. [de-identified] : 4/28/22 pt presents today for unna boot change. Tolerating LLE unna boot without complications. She reports her pain is better controlled, still taking Tylenol alternating w/ Ibuprofen and Gabapentin. Drainage is stable, she is draining through to mostly just the kerlix gauze layer. No new complaints from previous visit. Denies fever/chills.

## 2022-04-28 NOTE — PROCEDURE
[FreeTextEntry1] : Left leg wound cleansed w/ Chlorhexidine solution\par Silver Nitrate to wound\par No sting barrier film to periwound\par Aquacel Extra cut to size of wound\par Left unna boot re applied from toes to below knee, foam dressing used for absorbency outside the zinc layer

## 2022-04-28 NOTE — PHYSICAL EXAM
[2+] : left 2+ [Varicose Veins Of Lower Extremities] : present [] : present [Ankle Swelling On The Left] : moderate [Alert] : alert [Oriented to Person] : oriented to person [Oriented to Place] : oriented to place [Oriented to Time] : oriented to time [Calm] : calm [de-identified] : NAD [de-identified] : WNL. [FreeTextEntry1] : Left medial leg ulcer stable in size approx 5cm x 4cm w/ mild hypergranulation tissue, but filling in nicely to the surface. Moderate serous drainage. No erythema or s/s of infection. Periwound maceration is stable. [de-identified] : central obesity  [de-identified] : SANTOSL

## 2022-04-28 NOTE — ASSESSMENT
[Arterial/Venous Disease] : arterial/venous disease [Other: _____] : [unfilled] [Foot care/Footwear] : foot care/footwear [Ulcer Care] : ulcer care [FreeTextEntry1] : Venous Insufficiency w/ left medial malleolar ulcer slowly improving\par \par Medical Conservative Management leg elevation, encouraged diet and weight loss, compression stockings to right leg\par Continue left leg unna boot 2x week until wound completely heals\par RTO on Monday for unna boot change\par Will re evaluate for left GSV RFA once wound heals

## 2022-05-02 ENCOUNTER — APPOINTMENT (OUTPATIENT)
Dept: VASCULAR SURGERY | Facility: CLINIC | Age: 52
End: 2022-05-02
Payer: COMMERCIAL

## 2022-05-02 VITALS
BODY MASS INDEX: 43.4 KG/M2 | DIASTOLIC BLOOD PRESSURE: 86 MMHG | HEART RATE: 103 BPM | TEMPERATURE: 205.52 F | SYSTOLIC BLOOD PRESSURE: 139 MMHG | WEIGHT: 293 LBS | HEIGHT: 69 IN

## 2022-05-02 PROCEDURE — 29580 STRAPPING UNNA BOOT: CPT | Mod: LT

## 2022-05-02 NOTE — HISTORY OF PRESENT ILLNESS
[FreeTextEntry1] :  52 y/o f who presents with a 1 month history of left medial ankle wound. She also had a wound in the same location years ago which eventually healed. She is tolerating a left leg unna boot and last visit the frequency of changes was increases to 2x week d/t drainage. She denies any history of DVT or SVT. She reports pain in the leg at night w/ associated burning. Taking ibuprofen. Denies fever or chills. [de-identified] : 4/28/22 pt presents today for unna boot change. Tolerating LLE unna boot without complications. She reports her pain is significantly improved better, still taking Tylenol alternating w/ Ibuprofen and Gabapentin. Drainage is stable, she is draining through to the kerlix gauze layer. She did remove the unna boot last night herself d/t discomfort and possible generalized rash. As per pt., she has many sensitivities including dairy and ace inhibitors and following with an allergist. Denies fever/chills.

## 2022-05-02 NOTE — ASSESSMENT
[Arterial/Venous Disease] : arterial/venous disease [Other: _____] : [unfilled] [Foot care/Footwear] : foot care/footwear [Ulcer Care] : ulcer care [FreeTextEntry1] : Venous Insufficiency w/ left medial malleolar ulcer slowly improving and becoming more superficial. Her drainage and pain is improved as well\par \par Medical Conservative Management leg elevation, encouraged diet and weight loss, compression stockings to right leg\par Continue left leg unna boot 2x week until wound completely heals\par RTO on Thursday for unna boot change\par Will re evaluate for left GSV RFA w/ Dr Velasquez once wound heals

## 2022-05-02 NOTE — PROCEDURE
[FreeTextEntry1] : Left leg wound cleansed w/ Chlorhexidine solution\par No sting barrier film to periwound\par Aquacel Extra cut to size of wound\par Left unna boot w/ calamine applied from toes to below knee, foam dressing used for absorbency outside the zinc layer

## 2022-05-02 NOTE — PHYSICAL EXAM
[2+] : left 2+ [Varicose Veins Of Lower Extremities] : present [] : present [Ankle Swelling On The Left] : moderate [Alert] : alert [Oriented to Person] : oriented to person [Oriented to Place] : oriented to place [Oriented to Time] : oriented to time [Calm] : calm [de-identified] : NAD [de-identified] : WNL. [FreeTextEntry1] : Left medial leg ulcer stable in size approx 5cm x 4cm w/ mild hypergranulation tissue, but filling in nicely to the surface. Mild-moderate serous drainage improving. No erythema or s/s of infection. Periwound maceration is improving as well. \par No rash noted. Trace circumferential pinkish linear marks around left calf consistent w/ unna boot wrap. [de-identified] : central obesity  [de-identified] : SANTOSL

## 2022-05-05 ENCOUNTER — APPOINTMENT (OUTPATIENT)
Dept: VASCULAR SURGERY | Facility: CLINIC | Age: 52
End: 2022-05-05
Payer: COMMERCIAL

## 2022-05-05 VITALS
WEIGHT: 293 LBS | HEART RATE: 85 BPM | HEIGHT: 69 IN | DIASTOLIC BLOOD PRESSURE: 96 MMHG | TEMPERATURE: 207.32 F | SYSTOLIC BLOOD PRESSURE: 167 MMHG | BODY MASS INDEX: 43.4 KG/M2

## 2022-05-05 PROCEDURE — 29580 STRAPPING UNNA BOOT: CPT | Mod: LT

## 2022-05-05 NOTE — PHYSICAL EXAM
[2+] : left 2+ [Varicose Veins Of Lower Extremities] : present [] : present [Ankle Swelling On The Left] : moderate [Alert] : alert [Oriented to Person] : oriented to person [Oriented to Place] : oriented to place [Oriented to Time] : oriented to time [Calm] : calm [de-identified] : NAD [de-identified] : WNL. [FreeTextEntry1] : Left medial leg ulcer stable in size approx 5cm x 4cm w/ mild hypergranulation tissue, but filling in nicely to the surface and more superficial. Mild-moderate serous drainage improving. No erythema or s/s of infection. Periwound maceration is stable.\par No rash noted. Trace circumferential pinkish linear marks around left calf consistent w/ unna boot wrap. [de-identified] : central obesity  [de-identified] : SANTOSL

## 2022-05-05 NOTE — ASSESSMENT
[Arterial/Venous Disease] : arterial/venous disease [Other: _____] : [unfilled] [Foot care/Footwear] : foot care/footwear [Ulcer Care] : ulcer care [FreeTextEntry1] : Venous Insufficiency w/ left medial malleolar ulcer slowly improving and becoming more superficial. Her drainage and pain is improved as well\par \par Medical Conservative Management leg elevation, encouraged diet and weight loss, compression stockings to right leg\par Continue left leg unna boot 2x week until wound completely heals\par RTO on Monday for unna boot change\par Will re evaluate for left GSV RFA w/ Dr Velasquez once wound heals

## 2022-05-05 NOTE — PROCEDURE
[FreeTextEntry1] : Left leg wound cleansed w/ Chlorhexidine solution\par Silver Nitrate to wound \par No sting barrier film to periwound\par Aquacel Extra cut to size of wound\par Left unna boot w/ calamine applied from toes to below knee, foam dressing used for absorbency outside the zinc layer

## 2022-05-05 NOTE — HISTORY OF PRESENT ILLNESS
[FreeTextEntry1] :  50 y/o f who presents with a 1 month history of left medial ankle wound. She also had a wound in the same location years ago which eventually healed. She is tolerating a left leg unna boot and last visit the frequency of changes was increases to 2x week d/t drainage. She denies any history of DVT or SVT. She reports pain in the leg at night w/ associated burning. Taking ibuprofen. Denies fever or chills. [de-identified] : 5/5/22 pt presents today for unna boot change. Tolerating LLE unna boot without complications. She reports her pain is much better, still taking Ibuprofen and Gabapentin. Drainage is stable is improved, she is draining through to the kerlix gauze layer considerably less. She did well with the calamine unnaboot, denies rash. Denies fever/chills.\par

## 2022-05-09 ENCOUNTER — APPOINTMENT (OUTPATIENT)
Dept: VASCULAR SURGERY | Facility: CLINIC | Age: 52
End: 2022-05-09
Payer: COMMERCIAL

## 2022-05-09 VITALS
WEIGHT: 293 LBS | TEMPERATURE: 96.4 F | HEIGHT: 69 IN | BODY MASS INDEX: 43.4 KG/M2 | DIASTOLIC BLOOD PRESSURE: 84 MMHG | SYSTOLIC BLOOD PRESSURE: 146 MMHG | HEART RATE: 97 BPM

## 2022-05-09 PROCEDURE — 29580 STRAPPING UNNA BOOT: CPT | Mod: LT

## 2022-05-09 NOTE — HISTORY OF PRESENT ILLNESS
[FreeTextEntry1] :  50 y/o f who presents with a 1 month history of left medial ankle wound. She also had a wound in the same location years ago which eventually healed. She is tolerating a left leg unna boot and last visit the frequency of changes was increases to 2x week d/t drainage. She denies any history of DVT or SVT. She reports pain in the leg at night w/ associated burning. Taking ibuprofen. Denies fever or chills. [de-identified] : 5/9/22 pt presents today for unna boot change. Tolerating LLE unna boot without complications. She reports her pain is much better, still taking Ibuprofen and Gabapentin. Drainage is improving, the drainage through to the kerlix gauze layer considerably less. She is doing well with the calamine unnaboot, denies rash. Denies fever/chills.\par

## 2022-05-09 NOTE — ASSESSMENT
[Arterial/Venous Disease] : arterial/venous disease [Other: _____] : [unfilled] [Foot care/Footwear] : foot care/footwear [Ulcer Care] : ulcer care [FreeTextEntry1] : Venous Insufficiency w/ left medial malleolar ulcer slowly improving and becoming more superficial. Her drainage and pain is improved as well\par \par Medical Conservative Management leg elevation, encouraged diet and weight loss, compression stockings to right leg\par Continue left leg unna boot 2x week until wound completely heals\par RTO on Thursday for unna boot change\par Will re evaluate for left GSV RFA w/ Dr Velasquez once wound heals to help reduce the chance of wound recurrence

## 2022-05-09 NOTE — PHYSICAL EXAM
[2+] : left 2+ [Varicose Veins Of Lower Extremities] : present [] : present [Ankle Swelling On The Left] : moderate [Alert] : alert [Oriented to Person] : oriented to person [Oriented to Place] : oriented to place [Oriented to Time] : oriented to time [Calm] : calm [de-identified] : NAD [de-identified] : WNL. [FreeTextEntry1] : Left medial leg ulcer stable in size approx 5cm x 3.5cm w/ mild hypergranulation tissue, but filling in nicely to the surface and more superficial. Mild-moderate serous drainage stable but slowly improving. No erythema or s/s of infection. Periwound maceration is improving\par No rash noted. Trace circumferential pinkish linear marks around left calf consistent w/ unna boot wrap. [de-identified] : central obesity  [de-identified] : SANTOSL

## 2022-05-10 ENCOUNTER — TRANSCRIPTION ENCOUNTER (OUTPATIENT)
Age: 52
End: 2022-05-10

## 2022-05-12 ENCOUNTER — APPOINTMENT (OUTPATIENT)
Dept: VASCULAR SURGERY | Facility: CLINIC | Age: 52
End: 2022-05-12
Payer: COMMERCIAL

## 2022-05-12 VITALS
SYSTOLIC BLOOD PRESSURE: 140 MMHG | BODY MASS INDEX: 43.4 KG/M2 | DIASTOLIC BLOOD PRESSURE: 87 MMHG | HEART RATE: 94 BPM | TEMPERATURE: 97.8 F | WEIGHT: 293 LBS | HEIGHT: 69 IN

## 2022-05-12 PROCEDURE — 29580 STRAPPING UNNA BOOT: CPT | Mod: LT

## 2022-05-12 NOTE — ASSESSMENT
[Arterial/Venous Disease] : arterial/venous disease [Other: _____] : [unfilled] [Foot care/Footwear] : foot care/footwear [Ulcer Care] : ulcer care [FreeTextEntry1] : Venous Insufficiency w/ left medial malleolar ulcer slowly improving and becoming more superficial. Her drainage and pain is improved as well\par \par Medical Conservative Management leg elevation, encouraged diet and weight loss, compression stockings to right leg\par Continue left leg unna boot 2x week until wound completely heals\par RTO on Monday for unna boot change\par Will re evaluate for left GSV RFA w/ Dr Velasqeuz once wound heals to help reduce the chance of wound recurrence

## 2022-05-12 NOTE — HISTORY OF PRESENT ILLNESS
[FreeTextEntry1] :  52 y/o f who presents with a 1 month history of left medial ankle wound. She also had a wound in the same location years ago which eventually healed. She is tolerating a left leg unna boot and last visit the frequency of changes was increases to 2x week d/t drainage. She denies any history of DVT or SVT. She reports pain in the leg at night w/ associated burning. Taking ibuprofen. Denies fever or chills. [de-identified] : 5/12/22 pt presents today for LLE unna boot change. Tolerating LLE unna boot without complications. She reports her pain is much better, taking Gabapentin. Drainage is improving, the drainage through to the kerlix gauze layer is considerably less. She is doing well with the calamine unnaboot, denies rash. Denies fever/chills.\par

## 2022-05-12 NOTE — PROCEDURE
[FreeTextEntry1] : Left leg wound cleansed w/ Chlorhexidine solution\par Silver Nitrate to wound for hypergranulation\par No sting barrier film to periwound\par Aquacel Extra cut to size of wound\par Left unna boot w/ calamine applied from toes to below knee, foam dressing used for absorbency outside the zinc layer

## 2022-05-12 NOTE — PHYSICAL EXAM
[2+] : left 2+ [Varicose Veins Of Lower Extremities] : present [] : present [Ankle Swelling On The Left] : moderate [Alert] : alert [Oriented to Person] : oriented to person [Oriented to Place] : oriented to place [Oriented to Time] : oriented to time [Calm] : calm [de-identified] : NAD [de-identified] : WNL. [FreeTextEntry1] : Left medial ankle ulcer approx 5cm x 3.3cm w/ mild hypergranulation tissue, but filling in nicely to the surface and more superficial. Mild-moderate serous drainage stable but slowly improving. No erythema or s/s of infection. Periwound maceration is improving.\par CEAP C6 [de-identified] : central obesity  [de-identified] : SANTOSL

## 2022-05-16 ENCOUNTER — APPOINTMENT (OUTPATIENT)
Dept: VASCULAR SURGERY | Facility: CLINIC | Age: 52
End: 2022-05-16
Payer: COMMERCIAL

## 2022-05-16 VITALS
WEIGHT: 293 LBS | TEMPERATURE: 97.2 F | DIASTOLIC BLOOD PRESSURE: 84 MMHG | BODY MASS INDEX: 43.4 KG/M2 | HEIGHT: 69 IN | HEART RATE: 76 BPM | SYSTOLIC BLOOD PRESSURE: 153 MMHG

## 2022-05-16 PROCEDURE — 29580 STRAPPING UNNA BOOT: CPT | Mod: LT

## 2022-05-16 NOTE — PHYSICAL EXAM
[Normal Breath Sounds] : Normal breath sounds [Normal Heart Sounds] : normal heart sounds [2+] : left 2+ [de-identified] : NAD. Neurologically intact.  [de-identified] : WNL. [FreeTextEntry1] : Left medial leg ulcer 5x3cm. Clean, no signs of infection. More granulation tissue present today.

## 2022-05-19 ENCOUNTER — APPOINTMENT (OUTPATIENT)
Dept: VASCULAR SURGERY | Facility: CLINIC | Age: 52
End: 2022-05-19
Payer: COMMERCIAL

## 2022-05-19 VITALS
HEART RATE: 99 BPM | SYSTOLIC BLOOD PRESSURE: 153 MMHG | BODY MASS INDEX: 43.4 KG/M2 | DIASTOLIC BLOOD PRESSURE: 87 MMHG | TEMPERATURE: 94.5 F | HEIGHT: 69 IN | WEIGHT: 293 LBS

## 2022-05-19 DIAGNOSIS — Z97.5 PRESENCE OF (INTRAUTERINE) CONTRACEPTIVE DEVICE: ICD-10-CM

## 2022-05-19 PROCEDURE — 99213 OFFICE O/P EST LOW 20 MIN: CPT

## 2022-05-19 NOTE — PHYSICAL EXAM
[Normal Breath Sounds] : Normal breath sounds [Normal Heart Sounds] : normal heart sounds [] : of the left leg [Tender] : nontender [Enlarged] : not enlarged [Alert] : alert [Oriented to Person] : oriented to person [Oriented to Place] : oriented to place

## 2022-05-23 ENCOUNTER — APPOINTMENT (OUTPATIENT)
Dept: VASCULAR SURGERY | Facility: CLINIC | Age: 52
End: 2022-05-23
Payer: COMMERCIAL

## 2022-05-23 VITALS
TEMPERATURE: 97.3 F | DIASTOLIC BLOOD PRESSURE: 84 MMHG | SYSTOLIC BLOOD PRESSURE: 132 MMHG | HEIGHT: 69 IN | WEIGHT: 293 LBS | HEART RATE: 94 BPM | BODY MASS INDEX: 43.4 KG/M2

## 2022-05-23 PROCEDURE — 29580 STRAPPING UNNA BOOT: CPT | Mod: LT

## 2022-05-23 NOTE — PHYSICAL EXAM
[Normal Breath Sounds] : Normal breath sounds [Normal Heart Sounds] : normal heart sounds [2+] : left 2+ [de-identified] : NAD. Neurologically intact.  [de-identified] : WNL. [FreeTextEntry1] : Left medial leg ulcer 5x3cm. Clean, no signs of infection. More granulation tissue present today.

## 2022-05-26 ENCOUNTER — APPOINTMENT (OUTPATIENT)
Dept: VASCULAR SURGERY | Facility: CLINIC | Age: 52
End: 2022-05-26
Payer: COMMERCIAL

## 2022-05-26 VITALS
SYSTOLIC BLOOD PRESSURE: 136 MMHG | DIASTOLIC BLOOD PRESSURE: 83 MMHG | HEART RATE: 98 BPM | HEIGHT: 69 IN | TEMPERATURE: 98 F | WEIGHT: 293 LBS | BODY MASS INDEX: 43.4 KG/M2

## 2022-05-26 DIAGNOSIS — Z80.42 FAMILY HISTORY OF MALIGNANT NEOPLASM OF PROSTATE: ICD-10-CM

## 2022-05-26 DIAGNOSIS — Z30.9 ENCOUNTER FOR CONTRACEPTIVE MANAGEMENT, UNSPECIFIED: ICD-10-CM

## 2022-05-26 DIAGNOSIS — L03.116 CELLULITIS OF LEFT LOWER LIMB: ICD-10-CM

## 2022-05-26 DIAGNOSIS — E66.01 MORBID (SEVERE) OBESITY DUE TO EXCESS CALORIES: ICD-10-CM

## 2022-05-26 PROCEDURE — 99213 OFFICE O/P EST LOW 20 MIN: CPT | Mod: GE

## 2022-06-02 ENCOUNTER — APPOINTMENT (OUTPATIENT)
Dept: VASCULAR SURGERY | Facility: CLINIC | Age: 52
End: 2022-06-02
Payer: COMMERCIAL

## 2022-06-02 VITALS
TEMPERATURE: 95.36 F | HEART RATE: 108 BPM | BODY MASS INDEX: 43.4 KG/M2 | WEIGHT: 293 LBS | HEIGHT: 69 IN | SYSTOLIC BLOOD PRESSURE: 139 MMHG | DIASTOLIC BLOOD PRESSURE: 82 MMHG

## 2022-06-02 DIAGNOSIS — Z86.79 PERSONAL HISTORY OF OTHER DISEASES OF THE CIRCULATORY SYSTEM: ICD-10-CM

## 2022-06-02 PROCEDURE — 99212 OFFICE O/P EST SF 10 MIN: CPT | Mod: 25

## 2022-06-02 PROCEDURE — 29580 STRAPPING UNNA BOOT: CPT

## 2022-06-06 ENCOUNTER — APPOINTMENT (OUTPATIENT)
Dept: VASCULAR SURGERY | Facility: CLINIC | Age: 52
End: 2022-06-06
Payer: COMMERCIAL

## 2022-06-06 VITALS
BODY MASS INDEX: 43.4 KG/M2 | SYSTOLIC BLOOD PRESSURE: 119 MMHG | WEIGHT: 293 LBS | TEMPERATURE: 207.14 F | HEART RATE: 105 BPM | DIASTOLIC BLOOD PRESSURE: 82 MMHG | HEIGHT: 69 IN

## 2022-06-06 PROCEDURE — 29580 STRAPPING UNNA BOOT: CPT

## 2022-06-06 PROCEDURE — 99212 OFFICE O/P EST SF 10 MIN: CPT | Mod: 25

## 2022-06-06 NOTE — PHYSICAL EXAM
[Normal Breath Sounds] : Normal breath sounds [Normal Heart Sounds] : normal heart sounds [2+] : left 2+ [de-identified] : NAD. Neurologically intact.  [de-identified] : WNL. [FreeTextEntry1] : Left medial leg ulcer 5x3cm. Clean, no signs of infection. More granulation tissue present today.

## 2022-06-06 NOTE — ASSESSMENT
[FreeTextEntry1] : In summary, Mrs. Lui Hurst p/w chronic left medial ankle wound. Unna boot dressing was applied today. We will continue twice weekly dressing changes in office.

## 2022-06-09 ENCOUNTER — APPOINTMENT (OUTPATIENT)
Dept: VASCULAR SURGERY | Facility: CLINIC | Age: 52
End: 2022-06-09
Payer: COMMERCIAL

## 2022-06-09 VITALS
WEIGHT: 293 LBS | HEART RATE: 102 BPM | HEIGHT: 69 IN | SYSTOLIC BLOOD PRESSURE: 134 MMHG | TEMPERATURE: 203.9 F | DIASTOLIC BLOOD PRESSURE: 87 MMHG | BODY MASS INDEX: 43.4 KG/M2

## 2022-06-09 PROCEDURE — 29580 STRAPPING UNNA BOOT: CPT

## 2022-06-13 ENCOUNTER — APPOINTMENT (OUTPATIENT)
Dept: VASCULAR SURGERY | Facility: CLINIC | Age: 52
End: 2022-06-13
Payer: COMMERCIAL

## 2022-06-13 VITALS
HEART RATE: 73 BPM | BODY MASS INDEX: 43.4 KG/M2 | HEIGHT: 69 IN | DIASTOLIC BLOOD PRESSURE: 84 MMHG | TEMPERATURE: 206.96 F | WEIGHT: 293 LBS | SYSTOLIC BLOOD PRESSURE: 138 MMHG

## 2022-06-13 PROCEDURE — 99213 OFFICE O/P EST LOW 20 MIN: CPT | Mod: 25

## 2022-06-13 PROCEDURE — 29580 STRAPPING UNNA BOOT: CPT

## 2022-06-13 NOTE — ASSESSMENT
[FreeTextEntry1] : In summary, Mrs. Poe presents with chronic left medial ankle ulcer. Unna boot was applied today. She will continue with Unna boot therapy.

## 2022-06-13 NOTE — PHYSICAL EXAM
[Normal Breath Sounds] : Normal breath sounds [Normal Heart Sounds] : normal heart sounds [2+] : left 2+ [de-identified] : NAD. Neurologically intact.  [de-identified] : WNL. [FreeTextEntry1] : Left medial leg ulcer is now significantly smaller in size. Clean, no signs of infection. More granulation tissue present today.

## 2022-06-17 ENCOUNTER — APPOINTMENT (OUTPATIENT)
Dept: VASCULAR SURGERY | Facility: CLINIC | Age: 52
End: 2022-06-17
Payer: COMMERCIAL

## 2022-06-17 VITALS
DIASTOLIC BLOOD PRESSURE: 85 MMHG | WEIGHT: 293 LBS | SYSTOLIC BLOOD PRESSURE: 136 MMHG | HEART RATE: 109 BPM | TEMPERATURE: 204.98 F | BODY MASS INDEX: 43.4 KG/M2 | HEIGHT: 69 IN

## 2022-06-17 PROCEDURE — 29580 STRAPPING UNNA BOOT: CPT

## 2022-06-17 NOTE — ASSESSMENT
[FreeTextEntry1] : In summary, Mrs. Poe presents with chronic left medial ankle ulcer. Unna boot was applied today. She will continue with Unna boot therapy.  No pertinent family history in first degree relatives

## 2022-06-17 NOTE — HISTORY OF PRESENT ILLNESS
[FreeTextEntry1] : Mrs. Lui Hurst is a jammie 51 year old lady who presented with a 1 month history of left medial ankle wound. She reported a history of a wound in the same location, which occurred following injury in 2007. The wound took two years to heal completely. This was managed with Unna boots at the time. She had been applying Unna boots herself once/week. She denied any history of DVT or SVT. She reported cramping in the left leg which occurs after ambulating 1-2 blocks. She reported pain in the leg at night. She denied any history of right sided symptoms. She had previously worn compression stockings. She has not had any prior vascular surgical intervention on her lower extremities. Following her initial evaluation, we initiated twice weekly Unna boot dressings and she presents today for dressing change. 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 \par \par \par 6/17/22: Feeling well, notes wound is healing. No fevers or chills, erythema or drainage.\par \par

## 2022-06-17 NOTE — PHYSICAL EXAM
[Ankle Swelling (On Exam)] : present [Ankle Swelling Bilaterally] : bilaterally  [Varicose Veins Of Lower Extremities] : bilaterally [] : bilaterally [Skin Ulcer] : ulcer [Alert] : alert [Oriented to Person] : oriented to person [Oriented to Place] : oriented to place [Oriented to Time] : oriented to time [Calm] : calm [Purpura] : no purpura  [Petechiae] : no petechiae [Skin Induration] : no induration [de-identified] : NAD [FreeTextEntry1] : Wound with healthy pink granulation tissue, no erythema or drainage

## 2022-06-23 ENCOUNTER — APPOINTMENT (OUTPATIENT)
Dept: VASCULAR SURGERY | Facility: CLINIC | Age: 52
End: 2022-06-23
Payer: COMMERCIAL

## 2022-06-23 PROCEDURE — 99214 OFFICE O/P EST MOD 30 MIN: CPT | Mod: 25

## 2022-06-23 PROCEDURE — 29580 STRAPPING UNNA BOOT: CPT | Mod: LT

## 2022-06-23 NOTE — PHYSICAL EXAM
[Ankle Swelling (On Exam)] : present [Ankle Swelling Bilaterally] : bilaterally  [Varicose Veins Of Lower Extremities] : bilaterally [] : bilaterally [Purpura] : no purpura  [Petechiae] : no petechiae [Skin Ulcer] : ulcer [Skin Induration] : no induration [Alert] : alert [Oriented to Person] : oriented to person [Oriented to Place] : oriented to place [Oriented to Time] : oriented to time [Calm] : calm [de-identified] : NAD [FreeTextEntry1] : Wound with healthy pink granulation tissue, no erythema or drainage

## 2022-06-30 ENCOUNTER — APPOINTMENT (OUTPATIENT)
Dept: VASCULAR SURGERY | Facility: CLINIC | Age: 52
End: 2022-06-30

## 2022-06-30 VITALS
BODY MASS INDEX: 43.4 KG/M2 | HEART RATE: 105 BPM | SYSTOLIC BLOOD PRESSURE: 130 MMHG | HEIGHT: 69 IN | WEIGHT: 293 LBS | DIASTOLIC BLOOD PRESSURE: 85 MMHG | TEMPERATURE: 204.8 F

## 2022-06-30 PROCEDURE — 99212 OFFICE O/P EST SF 10 MIN: CPT | Mod: 25

## 2022-06-30 PROCEDURE — 29580 STRAPPING UNNA BOOT: CPT | Mod: LT

## 2022-06-30 NOTE — PHYSICAL EXAM
[Ankle Swelling (On Exam)] : present [Ankle Swelling Bilaterally] : bilaterally  [Varicose Veins Of Lower Extremities] : bilaterally [] : bilaterally [Purpura] : no purpura  [Petechiae] : no petechiae [Skin Ulcer] : ulcer [Skin Induration] : no induration [Alert] : alert [Oriented to Person] : oriented to person [Oriented to Place] : oriented to place [Oriented to Time] : oriented to time [Calm] : calm [de-identified] : NAD [FreeTextEntry1] : Wound with healthy pink granulation tissue, no erythema or drainage

## 2022-07-07 ENCOUNTER — APPOINTMENT (OUTPATIENT)
Dept: VASCULAR SURGERY | Facility: CLINIC | Age: 52
End: 2022-07-07

## 2022-07-07 VITALS
BODY MASS INDEX: 43.4 KG/M2 | HEIGHT: 69 IN | DIASTOLIC BLOOD PRESSURE: 84 MMHG | HEART RATE: 91 BPM | SYSTOLIC BLOOD PRESSURE: 148 MMHG | TEMPERATURE: 96.3 F | WEIGHT: 293 LBS

## 2022-07-07 PROCEDURE — 29580 STRAPPING UNNA BOOT: CPT

## 2022-07-07 NOTE — PHYSICAL EXAM
[Ankle Swelling (On Exam)] : present [Ankle Swelling Bilaterally] : bilaterally  [Varicose Veins Of Lower Extremities] : bilaterally [] : bilaterally [Purpura] : no purpura  [Petechiae] : no petechiae [Skin Ulcer] : ulcer [Skin Induration] : no induration [Alert] : alert [Oriented to Person] : oriented to person [Oriented to Place] : oriented to place [Oriented to Time] : oriented to time [Calm] : calm [de-identified] : NAD [FreeTextEntry1] : Wound with healthy pink granulation tissue, no erythema or drainage

## 2022-07-14 ENCOUNTER — APPOINTMENT (OUTPATIENT)
Dept: VASCULAR SURGERY | Facility: CLINIC | Age: 52
End: 2022-07-14

## 2022-07-14 VITALS
HEIGHT: 69 IN | BODY MASS INDEX: 42.21 KG/M2 | DIASTOLIC BLOOD PRESSURE: 83 MMHG | WEIGHT: 285 LBS | SYSTOLIC BLOOD PRESSURE: 138 MMHG | HEART RATE: 79 BPM

## 2022-07-14 PROCEDURE — 29580 STRAPPING UNNA BOOT: CPT

## 2022-07-14 NOTE — ASSESSMENT
[FreeTextEntry1] : wound is much improved. Continue Unna boot. \par Pt is planning to travel. She will continue wrapping the leg during her vacation and will follow up with us afterward.

## 2022-08-11 ENCOUNTER — APPOINTMENT (OUTPATIENT)
Dept: VASCULAR SURGERY | Facility: CLINIC | Age: 52
End: 2022-08-11

## 2022-08-11 VITALS
HEART RATE: 77 BPM | WEIGHT: 285 LBS | HEIGHT: 69 IN | TEMPERATURE: 97.3 F | BODY MASS INDEX: 42.21 KG/M2 | SYSTOLIC BLOOD PRESSURE: 140 MMHG | DIASTOLIC BLOOD PRESSURE: 80 MMHG

## 2022-08-11 PROCEDURE — 99213 OFFICE O/P EST LOW 20 MIN: CPT | Mod: 25

## 2022-08-11 PROCEDURE — 29580 STRAPPING UNNA BOOT: CPT | Mod: LT

## 2022-08-11 NOTE — PHYSICAL EXAM
[Ankle Swelling (On Exam)] : present [Ankle Swelling Bilaterally] : bilaterally  [Varicose Veins Of Lower Extremities] : bilaterally [] : bilaterally [Purpura] : no purpura  [Petechiae] : no petechiae [Skin Ulcer] : ulcer [Skin Induration] : no induration [Alert] : alert [Oriented to Person] : oriented to person [Oriented to Place] : oriented to place [Oriented to Time] : oriented to time [Calm] : calm [de-identified] : NAD [FreeTextEntry1] : Wound with healthy pink granulation tissue, no erythema or drainage\par Approximately 2 x 2 centimeter

## 2022-08-11 NOTE — ASSESSMENT
[FreeTextEntry1] : We performed Unna boot today in the office with application of silver nitrate.  Patient to follow-up in 1 week.

## 2022-08-11 NOTE — HISTORY OF PRESENT ILLNESS
[FreeTextEntry1] : Mrs. Lui Hurst is a jammie 51 year old lady who presented with a 1 month history of left medial ankle wound. She reported a history of a wound in the same location, which occurred following injury in 2007. The wound took two years to heal completely. This was managed with Unna boots at the time. She had been applying Unna boots herself once/week. She denied any history of DVT or SVT. She reported cramping in the left leg which occurs after ambulating 1-2 blocks. She reported pain in the leg at night. She denied any history of right sided symptoms. She had previously worn compression stockings. She has not had any prior vascular surgical intervention on her lower extremities. Following her initial evaluation, we initiated twice weekly Unna boot dressings and she presents today for dressing change. \par \par Return from a trip to [place] for last 3 weeks.  She states that she was not wearing Unna boot.  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 \par \par \par 6/17/22: Feeling well, notes wound is healing. No fevers or chills, erythema or drainage.\par \par

## 2022-08-15 ENCOUNTER — APPOINTMENT (OUTPATIENT)
Dept: VASCULAR SURGERY | Facility: CLINIC | Age: 52
End: 2022-08-15

## 2022-08-18 ENCOUNTER — APPOINTMENT (OUTPATIENT)
Dept: VASCULAR SURGERY | Facility: CLINIC | Age: 52
End: 2022-08-18

## 2022-08-18 VITALS
WEIGHT: 288 LBS | DIASTOLIC BLOOD PRESSURE: 83 MMHG | BODY MASS INDEX: 42.65 KG/M2 | HEIGHT: 69 IN | HEART RATE: 87 BPM | SYSTOLIC BLOOD PRESSURE: 129 MMHG | TEMPERATURE: 97 F

## 2022-08-18 PROCEDURE — 99213 OFFICE O/P EST LOW 20 MIN: CPT | Mod: 25

## 2022-08-18 PROCEDURE — 29580 STRAPPING UNNA BOOT: CPT

## 2022-08-18 NOTE — HISTORY OF PRESENT ILLNESS
[FreeTextEntry1] : Mrs. Lui Hurst is a jammie 51 year old lady who presented with a 1 month history of left medial ankle wound. She reported a history of a wound in the same location, which occurred following injury in 2007. The wound took two years to heal completely. This was managed with Unna boots at the time. She had been applying Unna boots herself once/week. She denied any history of DVT or SVT. She reported cramping in the left leg which occurs after ambulating 1-2 blocks. She reported pain in the leg at night. She denied any history of right sided symptoms. She had previously worn compression stockings. She has not had any prior vascular surgical intervention on her lower extremities. Following her initial evaluation, we initiated twice weekly Unna boot dressings and she presents today for dressing change. \par \par Return from a trip to [place] for last 3 weeks.  She states that she was not wearing Unna boot.  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 \par \par \par 6/17/22: Feeling well, notes wound is healing. No fevers or chills, erythema or drainage.\par \par

## 2022-08-18 NOTE — PHYSICAL EXAM
[Ankle Swelling (On Exam)] : present [Ankle Swelling Bilaterally] : bilaterally  [Varicose Veins Of Lower Extremities] : bilaterally [] : bilaterally [Purpura] : no purpura  [Petechiae] : no petechiae [Skin Ulcer] : ulcer [Skin Induration] : no induration [Alert] : alert [Oriented to Person] : oriented to person [Oriented to Place] : oriented to place [Oriented to Time] : oriented to time [Calm] : calm [de-identified] : NAD [FreeTextEntry1] : Wound with healthy pink granulation tissue, no erythema or drainage\par Approximately 2 x 2 centimeter

## 2022-08-25 ENCOUNTER — APPOINTMENT (OUTPATIENT)
Dept: VASCULAR SURGERY | Facility: CLINIC | Age: 52
End: 2022-08-25

## 2022-08-25 VITALS
TEMPERATURE: 97.6 F | HEART RATE: 76 BPM | BODY MASS INDEX: 42.65 KG/M2 | DIASTOLIC BLOOD PRESSURE: 71 MMHG | WEIGHT: 288 LBS | HEIGHT: 69 IN | SYSTOLIC BLOOD PRESSURE: 125 MMHG

## 2022-08-25 PROCEDURE — 29580 STRAPPING UNNA BOOT: CPT

## 2022-08-25 NOTE — PHYSICAL EXAM
[Ankle Swelling (On Exam)] : present [Ankle Swelling Bilaterally] : bilaterally  [Varicose Veins Of Lower Extremities] : bilaterally [] : bilaterally [Purpura] : no purpura  [Petechiae] : no petechiae [Skin Ulcer] : ulcer [Skin Induration] : no induration [Alert] : alert [Oriented to Person] : oriented to person [Oriented to Place] : oriented to place [Oriented to Time] : oriented to time [Calm] : calm [de-identified] : NAD [FreeTextEntry1] : Wound with healthy pink granulation tissue, no erythema or drainage\par Approximately 5.5 centimeter

## 2022-08-25 NOTE — HISTORY OF PRESENT ILLNESS
[FreeTextEntry1] : Mrs. Lui Hurst is a jammie 51 year old lady who presented with a 1 month history of left medial ankle wound. She reported a history of a wound in the same location, which occurred following injury in 2007. The wound took two years to heal completely. This was managed with Unna boots at the time. She had been applying Unna boots herself once/week. She denied any history of DVT or SVT. She reported cramping in the left leg which occurs after ambulating 1-2 blocks. She reported pain in the leg at night. She denied any history of right sided symptoms. She had previously worn compression stockings. She has not had any prior vascular surgical intervention on her lower extremities. Following her initial evaluation, we initiated twice weekly Unna boot dressings and she presents today for dressing change. \par \par Return from a trip to [place] for last 3 weeks.  She states that she was not wearing Unna boot.  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 \par \par \par 6/17/22: Feeling well, notes wound is healing. No fevers or chills, erythema or drainage.\par \par

## 2022-08-29 ENCOUNTER — APPOINTMENT (OUTPATIENT)
Dept: VASCULAR SURGERY | Facility: CLINIC | Age: 52
End: 2022-08-29

## 2022-08-29 VITALS
BODY MASS INDEX: 29.62 KG/M2 | SYSTOLIC BLOOD PRESSURE: 142 MMHG | WEIGHT: 200 LBS | DIASTOLIC BLOOD PRESSURE: 93 MMHG | HEIGHT: 69 IN | HEART RATE: 90 BPM

## 2022-08-29 PROCEDURE — 29580 STRAPPING UNNA BOOT: CPT | Mod: LT

## 2022-08-29 PROCEDURE — 99213 OFFICE O/P EST LOW 20 MIN: CPT | Mod: 25

## 2022-08-29 NOTE — ASSESSMENT
[FreeTextEntry1] : In summary, Mrs. Lui Hurst p/w BLE CVI. Left leg unna boot was applied today in the office. I will schedule her for bilateral GSV RFA at this point. She will return to see me next week on 9/8/22.

## 2022-08-29 NOTE — PHYSICAL EXAM
[Normal Breath Sounds] : Normal breath sounds [Normal Heart Sounds] : normal heart sounds [2+] : left 2+ [de-identified] : NAD. Neurologically intact.  [de-identified] : WNL. [FreeTextEntry1] : Left medial leg ulcer is now significantly smaller in size and almost completely healed. Clean, no signs of infection. More granulation tissue present today.

## 2022-09-08 ENCOUNTER — APPOINTMENT (OUTPATIENT)
Dept: VASCULAR SURGERY | Facility: CLINIC | Age: 52
End: 2022-09-08

## 2022-09-08 VITALS
HEIGHT: 69 IN | TEMPERATURE: 96.5 F | WEIGHT: 200 LBS | DIASTOLIC BLOOD PRESSURE: 84 MMHG | SYSTOLIC BLOOD PRESSURE: 138 MMHG | BODY MASS INDEX: 29.62 KG/M2 | HEART RATE: 99 BPM

## 2022-09-08 PROCEDURE — 99212 OFFICE O/P EST SF 10 MIN: CPT

## 2022-09-08 RX ORDER — AMOXICILLIN AND CLAVULANATE POTASSIUM 875; 125 MG/1; MG/1
875-125 TABLET, COATED ORAL
Qty: 14 | Refills: 0 | Status: ACTIVE | COMMUNITY
Start: 2022-09-08 | End: 1900-01-01

## 2022-09-08 NOTE — ASSESSMENT
[FreeTextEntry1] : In summary, Mrs. Lui Hurst p/w LLE CVI. He ulcer has almost healed but she has new surrounding erythema. I will place her on a one week course of antibiotics and have her return on 9/12/22 for a wound check. Leg was dressed with Kerlix and ace bandage today. \par

## 2022-09-08 NOTE — PHYSICAL EXAM
[Normal Breath Sounds] : Normal breath sounds [Normal Heart Sounds] : normal heart sounds [2+] : left 2+ [de-identified] : NAD. Neurologically intact.  [de-identified] : WNL. [FreeTextEntry1] : Left medial leg ulcer is now significantly smaller in size and covered with scab. Local surrounding erythema.

## 2022-09-08 NOTE — HISTORY OF PRESENT ILLNESS
[FreeTextEntry1] : Mrs. Lui Hurst is a jammie 51 year old lady who presented with a 1 month history of left medial ankle wound. She reported a history of a wound in the same location, which occurred following injury in 2007. The wound took two years to heal completely. This was managed with Unna boots at the time. She had been applying Unna boots herself once/week. She denied any history of DVT or SVT. She reported cramping in the left leg which occurs after ambulating 1-2 blocks. She reported pain in the leg at night. She denied any history of right sided symptoms. She had previously worn compression stockings. She has not had any prior vascular surgical intervention on her lower extremities. Following her initial evaluation, we initiated twice weekly Unna boot dressings, which was then decreased to once weekly and she presents today for dressing change. She removed her Unna boot three days ago and reports that yesterday noted some erythema around the ulcer which has now scabbed over. She denies any constitutional symptoms. 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

## 2022-09-12 ENCOUNTER — APPOINTMENT (OUTPATIENT)
Dept: VASCULAR SURGERY | Facility: CLINIC | Age: 52
End: 2022-09-12

## 2022-09-12 VITALS
TEMPERATURE: 97.6 F | BODY MASS INDEX: 29.62 KG/M2 | HEIGHT: 69 IN | SYSTOLIC BLOOD PRESSURE: 140 MMHG | DIASTOLIC BLOOD PRESSURE: 91 MMHG | HEART RATE: 88 BPM | WEIGHT: 200 LBS

## 2022-09-12 PROCEDURE — 29580 STRAPPING UNNA BOOT: CPT | Mod: LT

## 2022-09-12 PROCEDURE — 99212 OFFICE O/P EST SF 10 MIN: CPT | Mod: 25

## 2022-09-12 NOTE — HISTORY OF PRESENT ILLNESS
[FreeTextEntry1] : Mrs. Lui Hurst is a jammie 51 year old lady who presented with a 1 month history of left medial ankle wound. She reported a history of a wound in the same location, which occurred following injury in 2007. The wound took two years to heal completely. This was managed with Unna boots at the time. She had been applying Unna boots herself once/week. She denied any history of DVT or SVT. She reported cramping in the left leg which occurs after ambulating 1-2 blocks. She reported pain in the leg at night. She denied any history of right sided symptoms. She had previously worn compression stockings. She has not had any prior vascular surgical intervention on her lower extremities. Following her initial evaluation, we initiated twice weekly Unna boot dressings, which was then decreased to once weekly and she presents today for dressing change. During her last visit, last week, there was erythema around the ulcer which had scabbed over. She denied any constitutional symptoms. I started her on a course of antibiotics, which she continues to take. The erythema has resolved. 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

## 2022-09-12 NOTE — ASSESSMENT
[FreeTextEntry1] : In summary, Mrs. Poe p/w LLE CVI CEAP 6. Unna boot was applied to left leg today. She will follow up in one week.

## 2022-09-12 NOTE — PHYSICAL EXAM
[Normal Breath Sounds] : Normal breath sounds [Normal Heart Sounds] : normal heart sounds [2+] : left 2+ [de-identified] : NAD. Neurologically intact.  [de-identified] : WNL. [FreeTextEntry1] : Left medial leg ulcer is now significantly smaller in size and covered with scab. Local surrounding erythema has resolved.

## 2022-09-19 ENCOUNTER — APPOINTMENT (OUTPATIENT)
Dept: VASCULAR SURGERY | Facility: CLINIC | Age: 52
End: 2022-09-19

## 2022-09-19 VITALS
SYSTOLIC BLOOD PRESSURE: 153 MMHG | BODY MASS INDEX: 29.62 KG/M2 | DIASTOLIC BLOOD PRESSURE: 90 MMHG | TEMPERATURE: 97.2 F | HEART RATE: 93 BPM | WEIGHT: 200 LBS | HEIGHT: 69 IN

## 2022-09-19 PROCEDURE — 29580 STRAPPING UNNA BOOT: CPT | Mod: LT

## 2022-09-19 PROCEDURE — 99212 OFFICE O/P EST SF 10 MIN: CPT | Mod: 25

## 2022-09-19 NOTE — HISTORY OF PRESENT ILLNESS
[FreeTextEntry1] : Mrs. Lui Hurst is a jammie 51 year old lady who presented with a 1 month history of left medial ankle wound. She reported a history of a wound in the same location, which occurred following injury in 2007. The wound took two years to heal completely. This was managed with Unna boots at the time. She had been applying Unna boots herself once/week. She denied any history of DVT or SVT. She reported cramping in the left leg which occurs after ambulating 1-2 blocks. She reported pain in the leg at night. She denied any history of right sided symptoms. She had previously worn compression stockings. She has not had any prior vascular surgical intervention on her lower extremities. Following her initial evaluation, we initiated twice weekly Unna boot dressings, which was then decreased to once weekly and she presents today for dressing change. During a recent visit there was erythema around the ulcer which had scabbed over. She denied any constitutional symptoms. I started her on a course of antibiotics, which she completed. The erythema has resolved. 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

## 2022-09-19 NOTE — ASSESSMENT
[FreeTextEntry1] : In summary, Mrs. Lui Wilsonr p/w LLE CVI. Unna boot was applied to left leg today. She will return in one week for dressing change.\par  \par

## 2022-09-19 NOTE — PHYSICAL EXAM
[Normal Breath Sounds] : Normal breath sounds [Normal Heart Sounds] : normal heart sounds [2+] : left 2+ [de-identified] : NAD. Neurologically intact.  [de-identified] : WNL. [FreeTextEntry1] : Left medial leg ulcer is now significantly smaller in size and covered with scab. Local surrounding erythema has resolved.

## 2022-09-26 ENCOUNTER — APPOINTMENT (OUTPATIENT)
Dept: VASCULAR SURGERY | Facility: CLINIC | Age: 52
End: 2022-09-26

## 2022-09-26 VITALS
WEIGHT: 200 LBS | SYSTOLIC BLOOD PRESSURE: 139 MMHG | HEIGHT: 69 IN | DIASTOLIC BLOOD PRESSURE: 84 MMHG | TEMPERATURE: 95.9 F | BODY MASS INDEX: 29.62 KG/M2 | HEART RATE: 91 BPM

## 2022-09-26 PROCEDURE — 29580 STRAPPING UNNA BOOT: CPT | Mod: LT

## 2022-09-26 PROCEDURE — 99212 OFFICE O/P EST SF 10 MIN: CPT | Mod: 25

## 2022-09-26 NOTE — ASSESSMENT
[FreeTextEntry1] : In summary, Mrs. Liu Wilsonr p/w LLE CVI. Unna boot was placed today. She will return on 9/29/22 for twice weekly dressing change.

## 2022-09-26 NOTE — PHYSICAL EXAM
[Normal Breath Sounds] : Normal breath sounds [Normal Heart Sounds] : normal heart sounds [2+] : left 2+ [de-identified] : NAD. Neurologically intact.  [de-identified] : WNL. [FreeTextEntry1] : Left medial leg ulcer 1.5x1cm in size. No signs of infection.

## 2022-09-26 NOTE — HISTORY OF PRESENT ILLNESS
[FreeTextEntry1] : Mrs. Lui Hurst is a jammie 51 year old lady who presented with a 1 month history of left medial ankle wound. She reported a history of a wound in the same location, which occurred following injury in 2007. The wound took two years to heal completely. This was managed with Unna boots at the time. She had been applying Unna boots herself once/week. She denied any history of DVT or SVT. She reported cramping in the left leg which occurs after ambulating 1-2 blocks. She reported pain in the leg at night. She denied any history of right sided symptoms. She had previously worn compression stockings. She has not had any prior vascular surgical intervention on her lower extremities. Following her initial evaluation, we initiated twice weekly Unna boot dressings, which was then decreased to once weekly and she presents today for dressing change. During a recent visit there was erythema around the ulcer which had scabbed over. She denied any constitutional symptoms. I started her on a course of antibiotics, which she completed. The erythema has resolved. 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

## 2022-10-03 ENCOUNTER — APPOINTMENT (OUTPATIENT)
Dept: VASCULAR SURGERY | Facility: CLINIC | Age: 52
End: 2022-10-03

## 2022-10-03 VITALS
WEIGHT: 285 LBS | DIASTOLIC BLOOD PRESSURE: 90 MMHG | BODY MASS INDEX: 42.21 KG/M2 | HEIGHT: 69 IN | HEART RATE: 93 BPM | SYSTOLIC BLOOD PRESSURE: 135 MMHG | TEMPERATURE: 96.3 F

## 2022-10-03 PROCEDURE — 29580 STRAPPING UNNA BOOT: CPT | Mod: LT

## 2022-10-03 PROCEDURE — 99212 OFFICE O/P EST SF 10 MIN: CPT | Mod: 25

## 2022-10-03 NOTE — ASSESSMENT
[FreeTextEntry1] : In summary, Mrs. Lui Wilsonr p/w LLE CVI. Unna boot was placed today. She will return for twice weekly dressing changes. \par

## 2022-10-03 NOTE — PHYSICAL EXAM
[Normal Breath Sounds] : Normal breath sounds [Normal Heart Sounds] : normal heart sounds [2+] : left 2+ [de-identified] : NAD. Neurologically intact.  [de-identified] : WNL. [FreeTextEntry1] : Left medial leg ulcer 2.8x2.8cm in size. No signs of infection.

## 2022-10-06 ENCOUNTER — APPOINTMENT (OUTPATIENT)
Dept: VASCULAR SURGERY | Facility: CLINIC | Age: 52
End: 2022-10-06

## 2022-10-06 VITALS
WEIGHT: 285 LBS | DIASTOLIC BLOOD PRESSURE: 84 MMHG | SYSTOLIC BLOOD PRESSURE: 137 MMHG | HEART RATE: 94 BPM | BODY MASS INDEX: 42.21 KG/M2 | TEMPERATURE: 98.3 F | HEIGHT: 69 IN

## 2022-10-06 PROCEDURE — 29580 STRAPPING UNNA BOOT: CPT

## 2022-10-06 PROCEDURE — 99213 OFFICE O/P EST LOW 20 MIN: CPT | Mod: 25

## 2022-10-06 NOTE — ASSESSMENT
[FreeTextEntry1] : 52 yo female LLE CVI. Unna boot was placed today. She will return for twice weekly dressing changes. \par

## 2022-10-06 NOTE — PHYSICAL EXAM
[Normal Breath Sounds] : Normal breath sounds [Normal Heart Sounds] : normal heart sounds [2+] : left 2+ [de-identified] : NAD. Neurologically intact.  [de-identified] : WNL. [FreeTextEntry1] : Left medial leg ulcer 2.8x2.8cm in size. No signs of infection.

## 2022-10-06 NOTE — HISTORY OF PRESENT ILLNESS
[FreeTextEntry1] : Mrs. Lui Hurst is a jammie 51 year old lady who presented with a 1 month history of left medial ankle wound. She reported a history of a wound in the same location, which occurred following injury in 2007. The wound took two years to heal completely. This was managed with Unna boots at the time. She had been applying Unna boots herself once/week. She denied any history of DVT or SVT. She reported cramping in the left leg which occurs after ambulating 1-2 blocks. She reported pain in the leg at night. She denied any history of right sided symptoms. She had previously worn compression stockings. She has not had any prior vascular surgical intervention on her lower extremities. Following her initial evaluation, we initiated twice weekly Unna boot dressings, which was then decreased to once weekly and she presents today for dressing change. During a recent visit there was erythema around the ulcer which had scabbed over. She denied any constitutional symptoms. She was started her on a course of antibiotics, which she completed. The erythema has resolved. 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

## 2022-10-10 ENCOUNTER — APPOINTMENT (OUTPATIENT)
Dept: VASCULAR SURGERY | Facility: CLINIC | Age: 52
End: 2022-10-10

## 2022-10-10 VITALS
SYSTOLIC BLOOD PRESSURE: 130 MMHG | DIASTOLIC BLOOD PRESSURE: 87 MMHG | HEART RATE: 98 BPM | HEIGHT: 69 IN | BODY MASS INDEX: 42.21 KG/M2 | TEMPERATURE: 97.2 F | WEIGHT: 285 LBS

## 2022-10-10 PROCEDURE — 99212 OFFICE O/P EST SF 10 MIN: CPT | Mod: 25

## 2022-10-10 PROCEDURE — 29580 STRAPPING UNNA BOOT: CPT

## 2022-10-17 ENCOUNTER — APPOINTMENT (OUTPATIENT)
Dept: VASCULAR SURGERY | Facility: CLINIC | Age: 52
End: 2022-10-17

## 2022-10-17 VITALS
HEART RATE: 63 BPM | SYSTOLIC BLOOD PRESSURE: 138 MMHG | DIASTOLIC BLOOD PRESSURE: 90 MMHG | WEIGHT: 285 LBS | HEIGHT: 69 IN | BODY MASS INDEX: 42.21 KG/M2 | TEMPERATURE: 98.6 F

## 2022-10-17 PROCEDURE — 29580 STRAPPING UNNA BOOT: CPT

## 2022-10-17 PROCEDURE — 99212 OFFICE O/P EST SF 10 MIN: CPT | Mod: 25

## 2022-10-17 NOTE — PHYSICAL EXAM
[Normal Breath Sounds] : Normal breath sounds [Normal Heart Sounds] : normal heart sounds [2+] : left 2+ [de-identified] : NAD. Neurologically intact.  [de-identified] : WNL. [FreeTextEntry1] : Left medial leg ulcer 2.5x2.5cm in size. No signs of infection. New blister forming below ulcer.

## 2022-10-17 NOTE — ASSESSMENT
[FreeTextEntry1] : In summary, Mrs. Poe p/w CVI with ulcer. Unna boot was applied to left leg. She will return in one week. \par

## 2022-10-17 NOTE — PHYSICAL EXAM
[Normal Breath Sounds] : Normal breath sounds [Normal Heart Sounds] : normal heart sounds [2+] : left 2+ [de-identified] : NAD. Neurologically intact.  [de-identified] : WNL. [FreeTextEntry1] : Left medial leg ulcer 2.5x2.5cm in size. No signs of infection. New blister forming below ulcer.

## 2022-10-17 NOTE — HISTORY OF PRESENT ILLNESS
[FreeTextEntry1] : Mrs. Lui Hurst is a jammie 51 year old lady who presented with a 1 month history of left medial ankle wound. She reported a history of a wound in the same location, which occurred following injury in 2007. The wound took two years to heal completely. This was managed with Unna boots at the time. She had been applying Unna boots herself once/week. She denied any history of DVT or SVT. She reported cramping in the left leg which occurs after ambulating 1-2 blocks. She reported pain in the leg at night. She denied any history of right sided symptoms. She had previously worn compression stockings. She has not had any prior vascular surgical intervention on her lower extremities. Following her initial evaluation, we initiated twice weekly Unna boot dressings, which was then decreased to once weekly and she presents today for dressing change. During a recent visit there was erythema around the ulcer which had scabbed over. She denied any constitutional symptoms. I started her on a course of antibiotics, which she completed. The erythema has resolved. The scab has now fallen off the ulcer and it appears larger, though clean.\par \par Interval history: New area appears to be forming blister just inferior to present ulcer. 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 [de-identified] : No new complaints. Presents for Unna boot dressing.

## 2022-10-17 NOTE — HISTORY OF PRESENT ILLNESS
[FreeTextEntry1] : Mrs. Lui Hurst is a jammie 51 year old lady who presented with a 1 month history of left medial ankle wound. She reported a history of a wound in the same location, which occurred following injury in 2007. The wound took two years to heal completely. This was managed with Unna boots at the time. She had been applying Unna boots herself once/week. She denied any history of DVT or SVT. She reported cramping in the left leg which occurs after ambulating 1-2 blocks. She reported pain in the leg at night. She denied any history of right sided symptoms. She had previously worn compression stockings. She has not had any prior vascular surgical intervention on her lower extremities. Following her initial evaluation, we initiated twice weekly Unna boot dressings, which was then decreased to once weekly and she presents today for dressing change. During a recent visit there was erythema around the ulcer which had scabbed over. She denied any constitutional symptoms. I started her on a course of antibiotics, which she completed. The erythema has resolved. The scab has now fallen off the ulcer and it appears larger, though clean.\par \par Interval history: New area appears to be forming blister just inferior to present ulcer. 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 [de-identified] : No new complaints. Presents for Unna boot dressing change.

## 2022-10-17 NOTE — ASSESSMENT
[FreeTextEntry1] : In summary, Mrs. Poe p/w CVI with ulcer. Unna boot was applied to left leg. She will return in one week.

## 2022-10-24 ENCOUNTER — APPOINTMENT (OUTPATIENT)
Dept: VASCULAR SURGERY | Facility: CLINIC | Age: 52
End: 2022-10-24

## 2022-10-24 VITALS
HEART RATE: 75 BPM | SYSTOLIC BLOOD PRESSURE: 131 MMHG | TEMPERATURE: 98.4 F | WEIGHT: 285 LBS | DIASTOLIC BLOOD PRESSURE: 85 MMHG | BODY MASS INDEX: 42.21 KG/M2 | HEIGHT: 69 IN

## 2022-10-24 PROCEDURE — 99211 OFF/OP EST MAY X REQ PHY/QHP: CPT | Mod: 25

## 2022-10-24 PROCEDURE — 29580 STRAPPING UNNA BOOT: CPT

## 2022-10-24 NOTE — PHYSICAL EXAM
[Normal Breath Sounds] : Normal breath sounds [Normal Heart Sounds] : normal heart sounds [2+] : left 2+ [de-identified] : NAD. Neurologically intact.  [de-identified] : WNL. [FreeTextEntry1] : Left medial leg ulcer. No signs of infection.

## 2022-10-31 ENCOUNTER — APPOINTMENT (OUTPATIENT)
Dept: VASCULAR SURGERY | Facility: CLINIC | Age: 52
End: 2022-10-31

## 2022-10-31 VITALS
HEIGHT: 69 IN | SYSTOLIC BLOOD PRESSURE: 126 MMHG | DIASTOLIC BLOOD PRESSURE: 81 MMHG | WEIGHT: 285 LBS | TEMPERATURE: 98.6 F | BODY MASS INDEX: 42.21 KG/M2 | HEART RATE: 88 BPM

## 2022-10-31 PROCEDURE — 99212 OFFICE O/P EST SF 10 MIN: CPT | Mod: 25

## 2022-10-31 PROCEDURE — 29580 STRAPPING UNNA BOOT: CPT | Mod: LT

## 2022-10-31 RX ORDER — LIDOCAINE HYDROCHLORIDE 10 MG/ML
1 INJECTION, SOLUTION INFILTRATION; PERINEURAL
Qty: 1 | Refills: 0 | Status: ACTIVE | COMMUNITY
Start: 2022-10-31 | End: 1900-01-01

## 2022-10-31 RX ORDER — SODIUM BICARBONATE 84 MG/ML
8.4 INJECTION, SOLUTION INTRAVENOUS
Qty: 1 | Refills: 0 | Status: ACTIVE | COMMUNITY
Start: 2022-10-31 | End: 1900-01-01

## 2022-10-31 RX ORDER — SODIUM CHLORIDE 9 G/ML
0.9 INJECTION, SOLUTION INTRAVENOUS
Qty: 1 | Refills: 0 | Status: ACTIVE | COMMUNITY
Start: 2022-10-31 | End: 1900-01-01

## 2022-10-31 NOTE — HISTORY OF PRESENT ILLNESS
[FreeTextEntry1] : Mrs. Lui Hurst is a jammie 51 year old lady who presented with a 1 month history of left medial ankle wound. She reported a history of a wound in the same location, which occurred following injury in 2007. The wound took two years to heal completely. This was managed with Unna boots at the time. She had been applying Unna boots herself once/week. She denied any history of DVT or SVT. She reported cramping in the left leg which occurs after ambulating 1-2 blocks. She reported pain in the leg at night. She denied any history of right sided symptoms. She had previously worn compression stockings. She has not had any prior vascular surgical intervention on her lower extremities. Following her initial evaluation, we initiated twice weekly Unna boot dressings, which was then decreased to once weekly and she presents today for dressing change. During a recent visit there was erythema around the ulcer which had scabbed over. She denied any constitutional symptoms. I started her on a course of antibiotics, which she completed. The erythema has resolved. \par \par Interval history: New area appears to be forming blister just inferior to present ulcer. 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 Poe returns for Unna boot dressing change. Wound is healing. She is scheduled for ablation this week as well.

## 2022-10-31 NOTE — PHYSICAL EXAM
[Normal Breath Sounds] : Normal breath sounds [Normal Heart Sounds] : normal heart sounds [2+] : left 2+ [de-identified] : NAD. Neurologically intact.  [de-identified] : WNL. [FreeTextEntry1] : Left medial leg ulcer. No signs of infection.

## 2022-10-31 NOTE — ASSESSMENT
[FreeTextEntry1] : In summary, Mrs. Poe p/w CVI with ulcer. Unna boot was applied to left leg. She will return on 10/3/22 for her procedure.

## 2022-11-03 ENCOUNTER — APPOINTMENT (OUTPATIENT)
Dept: VASCULAR SURGERY | Facility: CLINIC | Age: 52
End: 2022-11-03

## 2022-11-03 PROCEDURE — 36475 ENDOVENOUS RF 1ST VEIN: CPT | Mod: LT

## 2022-11-03 NOTE — PROCEDURE
[FreeTextEntry1] : left GSV RFA [FreeTextEntry3] : Procedural safety checklist and time out completed:\par Confirmed patient identification (Patient Name, , and/or medical record number including when possible affirmation by patient or parent/family/other).\par Confirmed procedure with the patient. Consent present, accurate and signed. \par Confirmed special equipment and supplies are present.\par Sterility confirmed. Position verified. \par Site/ side is marked and visible and confirmed. \par Procedure confirmed by consent. Accurate consent including side and site.\par Review of medical records, including venous ultrasound, noting correct procedure including site and side.\par MD/PA verifies presence and review of imaging studies and or written report of imaging studies.\par Agreement on the procedure to be performed\par Time out completed.\par All of the above has been confirmed by the team.\par All patient-specific concerns have been addressed. \par \par Indication: left lower extremity varicose veins with ulcer, inflammation, leg pain, leg swelling, and leg cramping.  Venous insufficiency/ reflux.\par \par Procedure: radiofrequency ablation of the left great saphenous vein. \par 	\par Ms. DIALLO SAUCEDO is a 51 year old F with a history of left lower extremity varicose veins previously seen in the office.  Ultrasound examination demonstrated venous insufficiency. A trial of compression stockings, exercise, elevation, and pain medication was attempted without relief and definitive treatment with radiofrequency ablation was offered. \par \par The patient has come for radiofrequency ablation treatment of the left great saphenous vein.\par I have discussed the risks of the procedure at length with the patient. The risks discussed were inclusive of but not limited to infection, irritation at the site of infiltration of local anesthesia, and also rare risk of deep venous thrombosis and pulmonary emboli. The patient agrees to proceed with the procedure. \par The patient was escorted into the procedure room and a time out called.\par The entire limb was prepped and draped in sterile fashion. The RF fiber was placed on the sterile field and connected by a sterile cable. Actuation, temperature, and impedance testing were performed to ensure that all components were connected and operating properly. \par The patient was placed on the procedure table and local anesthesia was instilled in the skin overlying the access site. Under ultrasound guidance, the vein was punctured with a micropuncture needle, using the anterior wall technique. A guide wire was now introduced through the needle, and the needle was then exchanged over the guide wire for a 7F sheath. The guide wire was removed and the RF probe was then placed into the left great saphenous vein through the sheath and position confirmed using ultrasound guidance. After the RF probe position was verified by ultrasound, tumescent anesthesia consisting of normal saline, 1% lidocaine with 8.4% sodium bicarbonate was infiltrated, under ultrasound guidance, precisely into the perivenous compartment along the entire length of the vein until a “halo” of fluid was noted around the vein. After RF probe position was again confirmed with ultrasound imaging, RF energy was applied. The probe was gradually and carefully withdrawn at a rate of 6.5cm/20seconds. \par \par 8 cycles of RF performed using the 7 cm probe\par Total treatment time was 160 seconds.\par The total volume injected was 450  cc\par Treatment length was 46 cm and \par The probe is 3.7 cm from the SFJ.\par \par Estimated Blood Loss: minimal\par Repeat ultrasound of the treated vein was performed confirming successful treatment. The catheter and sheath were withdrawn and hemostasis established with direct pressure. After assuring hemostasis, a sterile 4x4 was placed on the access site and an ACE compression wrap was applied. Patient tolerated procedure well. Patient was given post-procedure instructions and follow up appointment was scheduled.\par \par \par

## 2022-11-07 ENCOUNTER — APPOINTMENT (OUTPATIENT)
Dept: VASCULAR SURGERY | Facility: CLINIC | Age: 52
End: 2022-11-07
Payer: COMMERCIAL

## 2022-11-07 PROCEDURE — 93971 EXTREMITY STUDY: CPT | Mod: LT

## 2022-11-14 ENCOUNTER — APPOINTMENT (OUTPATIENT)
Dept: VASCULAR SURGERY | Facility: CLINIC | Age: 52
End: 2022-11-14

## 2022-11-14 VITALS
BODY MASS INDEX: 42.21 KG/M2 | TEMPERATURE: 98.3 F | HEART RATE: 78 BPM | SYSTOLIC BLOOD PRESSURE: 133 MMHG | DIASTOLIC BLOOD PRESSURE: 93 MMHG | HEIGHT: 69 IN | WEIGHT: 285 LBS

## 2022-11-14 PROCEDURE — 29580 STRAPPING UNNA BOOT: CPT | Mod: LT

## 2022-11-14 PROCEDURE — 99213 OFFICE O/P EST LOW 20 MIN: CPT | Mod: 25

## 2022-11-14 NOTE — PHYSICAL EXAM
[Normal Breath Sounds] : Normal breath sounds [Normal Heart Sounds] : normal heart sounds [2+] : left 2+ [de-identified] : NAD. Neurologically intact.  [de-identified] : WNL. [FreeTextEntry1] : Left medial leg ulcer. No signs of infection.

## 2022-11-14 NOTE — ASSESSMENT
[FreeTextEntry1] : Unna boot dressing applied. Continue weekly dressings. Continue treatment of CVI as scheduled.

## 2022-11-14 NOTE — HISTORY OF PRESENT ILLNESS
[FreeTextEntry1] : Mrs. Lui Hurst is a jammie 51 year old lady who presented with a 1 month history of left medial ankle wound. She reported a history of a wound in the same location, which occurred following injury in 2007. The wound took two years to heal completely. This was managed with Unna boots at the time. She had been applying Unna boots herself once/week. She denied any history of DVT or SVT. She reported cramping in the left leg which occurs after ambulating 1-2 blocks. She reported pain in the leg at night. She denied any history of right sided symptoms. She had previously worn compression stockings. She has not had any prior vascular surgical intervention on her lower extremities. Following her initial evaluation, we initiated twice weekly Unna boot dressings, which was then decreased to once weekly and she presents today for dressing change. During a recent visit there was erythema around the ulcer which had scabbed over. She denied any constitutional symptoms. I started her on a course of antibiotics, which she completed. The erythema has resolved. \par \par Interval history: New area appears to be forming blister just inferior to present ulcer. 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 Poe is now s/p left GSV RFA. Post-procedure duplex showed vein was closed without evidence of DVT. She is scheduled to undergo left below knee Varithena and right GSV RFA. She presents today for Unna boot change. The ulcer appears deeper today.

## 2022-11-21 ENCOUNTER — APPOINTMENT (OUTPATIENT)
Dept: VASCULAR SURGERY | Facility: CLINIC | Age: 52
End: 2022-11-21

## 2022-11-21 VITALS
HEART RATE: 72 BPM | HEIGHT: 69 IN | SYSTOLIC BLOOD PRESSURE: 113 MMHG | WEIGHT: 285 LBS | TEMPERATURE: 98.5 F | BODY MASS INDEX: 42.21 KG/M2 | DIASTOLIC BLOOD PRESSURE: 78 MMHG

## 2022-11-21 PROCEDURE — 29580 STRAPPING UNNA BOOT: CPT | Mod: LT

## 2022-11-21 RX ORDER — SODIUM BICARBONATE 84 MG/ML
8.4 INJECTION, SOLUTION INTRAVENOUS
Qty: 1 | Refills: 0 | Status: ACTIVE | COMMUNITY
Start: 2022-11-21 | End: 1900-01-01

## 2022-11-21 RX ORDER — SODIUM CHLORIDE 9 G/ML
0.9 INJECTION, SOLUTION INTRAVENOUS
Qty: 1 | Refills: 0 | Status: ACTIVE | COMMUNITY
Start: 2022-11-21 | End: 1900-01-01

## 2022-11-21 RX ORDER — LIDOCAINE HYDROCHLORIDE 10 MG/ML
1 INJECTION, SOLUTION INFILTRATION; PERINEURAL
Qty: 1 | Refills: 0 | Status: ACTIVE | COMMUNITY
Start: 2022-11-21 | End: 1900-01-01

## 2022-11-21 NOTE — PHYSICAL EXAM
[Normal Breath Sounds] : Normal breath sounds [Normal Heart Sounds] : normal heart sounds [2+] : left 2+ [de-identified] : NAD. Neurologically intact.  [de-identified] : WNL. [FreeTextEntry1] : Left medial leg ulcer. No signs of infection.

## 2022-11-21 NOTE — ASSESSMENT
[FreeTextEntry1] : Unna boot dressing applied. Continue weekly dressings. Continue treatment of CVI as scheduled. \par \par

## 2022-11-21 NOTE — HISTORY OF PRESENT ILLNESS
[FreeTextEntry1] : Mrs. Lui Hurst is a jammie 51 year old lady who presented with a 1 month history of left medial ankle wound. She reported a history of a wound in the same location, which occurred following injury in 2007. The wound took two years to heal completely. This was managed with Unna boots at the time. She had been applying Unna boots herself once/week. She denied any history of DVT or SVT. She reported cramping in the left leg which occurs after ambulating 1-2 blocks. She reported pain in the leg at night. She denied any history of right sided symptoms. She had previously worn compression stockings. She has not had any prior vascular surgical intervention on her lower extremities. Following her initial evaluation, we initiated twice weekly Unna boot dressings, which was then decreased to once weekly and she presents today for dressing change. During a recent visit there was erythema around the ulcer which had scabbed over. She denied any constitutional symptoms. I started her on a course of antibiotics, which she completed. The erythema has resolved. \par \par Interval history: New area appears to be forming blister just inferior to present ulcer. 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

## 2022-11-28 ENCOUNTER — APPOINTMENT (OUTPATIENT)
Dept: VASCULAR SURGERY | Facility: CLINIC | Age: 52
End: 2022-11-28

## 2022-11-28 VITALS
HEIGHT: 69 IN | SYSTOLIC BLOOD PRESSURE: 145 MMHG | TEMPERATURE: 98 F | DIASTOLIC BLOOD PRESSURE: 94 MMHG | BODY MASS INDEX: 42.21 KG/M2 | HEART RATE: 78 BPM | WEIGHT: 285 LBS

## 2022-11-28 PROCEDURE — 29580 STRAPPING UNNA BOOT: CPT | Mod: LT

## 2022-11-28 PROCEDURE — 99212 OFFICE O/P EST SF 10 MIN: CPT | Mod: 25

## 2022-11-28 NOTE — HISTORY OF PRESENT ILLNESS
[FreeTextEntry1] : Mrs. Lui Hurst is a jammie 51 year old lady who presented with a 1 month history of left medial ankle wound. She reported a history of a wound in the same location, which occurred following injury in 2007. The wound took two years to heal completely. This was managed with Unna boots at the time. She had been applying Unna boots herself once/week. She denied any history of DVT or SVT. She reported cramping in the left leg which occurs after ambulating 1-2 blocks. She reported pain in the leg at night. She denied any history of right sided symptoms. She had previously worn compression stockings. She has not had any prior vascular surgical intervention on her lower extremities. Following her initial evaluation, we initiated twice weekly Unna boot dressings, which was then decreased to once weekly and she presents today for dressing change. During a recent visit there was erythema around the ulcer which had scabbed over. She denied any constitutional symptoms. I started her on a course of antibiotics, which she completed. The erythema has resolved. \par \par Interval history: New area appears to be forming blister just inferior to present ulcer. 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 Poe is doing well. Presents for Unna boot dressing change.

## 2022-11-28 NOTE — PHYSICAL EXAM
[Normal Breath Sounds] : Normal breath sounds [Normal Heart Sounds] : normal heart sounds [2+] : left 2+ [de-identified] : NAD. Neurologically intact.  [de-identified] : WNL. [FreeTextEntry1] : Left medial leg ulcer. No signs of infection.

## 2022-12-01 ENCOUNTER — APPOINTMENT (OUTPATIENT)
Dept: VASCULAR SURGERY | Facility: CLINIC | Age: 52
End: 2022-12-01

## 2022-12-01 PROCEDURE — 36465Z: CUSTOM | Mod: LT

## 2022-12-01 NOTE — PROCEDURE
[FreeTextEntry1] : Ultrasound guided microfoam chemical ablation with Varithena® of the left distal  great saphenous vein great saphenous vein [FreeTextEntry3] : Procedural safety checklist and time out completed:\par Confirmed patient identification (Patient Name, , and/or medical record number including when possible affirmation by patient or parent/family/other).\par Confirmed procedure with the patient. Consent present, accurate and signed. \par Confirmed special equipment and supplies are present.\par Sterility confirmed. Position verified. \par Site/ side is marked and visible and confirmed. \par Procedure confirmed by consent. Accurate consent including side and site.\par Review of medical records, including venous ultrasound, noting correct procedure including site and side.\par MD/PA verifies presence and review of imaging studies and or written report of imaging studies.\par Agreement on the procedure to be performed\par Time out completed.\par All of the above has been confirmed by the team.\par All patient-specific concerns have been addressed. \par \par Indication: left lower extremity varicose veins with inflammation, leg pain, leg swelling, and leg cramping.  Venous insufficiency/ reflux.\par \par Procedure: Ultrasound guided microfoam chemical ablation with Varithena® of the left distal  great saphenous vein great saphenous vein\par 	\par [Ms. DIALLO SAUCEDO is a 52 year old F with a history of  left lower extremity varicose veins and venous insufficiency previously seen in the office.  Ultrasound examination demonstrated venous insufficiency. A trial of compression stockings, exercise, elevation, and pain medication was attempted without relief and definitive treatment with radiofrequency ablation was offered. \par \par The patient has come for ultrasound guided microfoam chemical ablation with Varithena® of the left distal  great saphenous vein\par I have discussed the risks of the procedure at length with the patient. The risks discussed were inclusive of but not limited to infection, irritation at the site of infiltration of local anesthesia, and also rare risk of deep venous thrombosis and pulmonary emboli. The patient agrees to proceed with the procedure. \par The patient was escorted into the procedure room and a time out called.\par \par The patient was placed on the procedure room table and was evaluated in reverse Trendelenburg position. The leg was prepped and sterile drapes applied. 1.0% lidocaine was administered at the chosen access site for local anesthesia. The vein was accessed using a 4F micro-puncture needle followed by a guide wire through the punctured needle and dilator sheath, standard IV catheters, or butterfly needle under advanced ultrasound guidance. Vein access was established and confirmed by aspiration of dark low pressure blood. After gaining access at distal ankle, the leg was positioned at 45 degrees of elevation in relationship to the torso. \par The Varithena canister was primed and purged as required by the instructions. A 4 mL aliquot was drawn into a sterile syringe then attached to the access device. \par \par Varithena was administered at 1.0 cc per second with close observation under ultrasound in its course of the GSV. The vein was observed for spasm under ultrasound, once vein was in full spasm the administration of Varithena was stopped \par \par After the administration of Varithena the patient was asked to dorsiflex the ankle to limit flow of foam into perforating veins. Once appropriate spasm had been confirmed in the treated veins, the access device was removed from the leg and light pressure was applied to the puncture site for hemostasis.\par \par The common femoral and deep superficial veins were then evaluated for flow and compressibility prior to dressing placement. The lower extremity was kept elevated at 45 degree angle and a multilayer dressing was applied including an under layer, compression pad, and thigh high 20-30 mmHg compression stocking to the patient. The leg was lowered only after compression had been applied. \par \par The patient ambulated 10 minutes under supervision and was without concerns at time of release. The patient was instructed to take an anti-inflammatory medicine as needed and to follow up for duplex scan of treated vein. \par \par Patient tolerated procedure, was given post-procedure instructions and a follow-up appt scheduled.\par Post-care instructions include walking, wearing compression during the day, taking off only to shower and then reapplying for two weeks, advising patient to keep post-treatment bandages in place and dry for 48 hours, avoid extended periods of inactivity, avoid heavy exercise for one week, to walk daily for 10 minutes over the next month. The patient was instructed to take an anti-inflammatory medicine as needed.\par LOT#  [id number]\par EXP 2023\par \par

## 2022-12-08 ENCOUNTER — APPOINTMENT (OUTPATIENT)
Dept: VASCULAR SURGERY | Facility: CLINIC | Age: 52
End: 2022-12-08
Payer: COMMERCIAL

## 2022-12-08 VITALS
HEART RATE: 80 BPM | WEIGHT: 285 LBS | DIASTOLIC BLOOD PRESSURE: 80 MMHG | HEIGHT: 69 IN | BODY MASS INDEX: 42.21 KG/M2 | TEMPERATURE: 98.5 F | SYSTOLIC BLOOD PRESSURE: 130 MMHG

## 2022-12-08 DIAGNOSIS — I87.2 VENOUS INSUFFICIENCY (CHRONIC) (PERIPHERAL): ICD-10-CM

## 2022-12-08 PROCEDURE — 93971 EXTREMITY STUDY: CPT | Mod: LT

## 2022-12-08 PROCEDURE — 29580 STRAPPING UNNA BOOT: CPT | Mod: LT

## 2022-12-08 PROCEDURE — 99212 OFFICE O/P EST SF 10 MIN: CPT | Mod: 25

## 2022-12-08 RX ORDER — SODIUM BICARBONATE 84 MG/ML
8.4 INJECTION, SOLUTION INTRAVENOUS
Qty: 1 | Refills: 0 | Status: ACTIVE | COMMUNITY
Start: 2022-12-08 | End: 1900-01-01

## 2022-12-08 RX ORDER — SODIUM CHLORIDE 9 G/ML
0.9 INJECTION, SOLUTION INTRAVENOUS
Qty: 1 | Refills: 0 | Status: ACTIVE | COMMUNITY
Start: 2022-12-08 | End: 1900-01-01

## 2022-12-08 RX ORDER — LIDOCAINE HYDROCHLORIDE 10 MG/ML
1 INJECTION, SOLUTION INFILTRATION; PERINEURAL
Qty: 1 | Refills: 0 | Status: ACTIVE | COMMUNITY
Start: 2022-12-08 | End: 1900-01-01

## 2022-12-08 NOTE — PHYSICAL EXAM
[Normal Breath Sounds] : Normal breath sounds [Normal Heart Sounds] : normal heart sounds [2+] : left 2+ [Skin Ulcer] : ulcer [Alert] : alert [Oriented to Person] : oriented to person [Oriented to Place] : oriented to place [Oriented to Time] : oriented to time [Calm] : calm [Stool Sample Taken] : No stool obtained  on rectal exam [de-identified] : NAD. Neurologically intact.  [de-identified] : WNL. [FreeTextEntry1] : Left medial leg ulcer. No signs of infection.  [de-identified] : SANTOSL [de-identified] : left medial leg [de-identified] : cooperative

## 2022-12-08 NOTE — PROCEDURE
[FreeTextEntry1] : Left Unna boot applied from toes to knee  w/o complications\par Pt cedrick procedure well\par Pt is not allergic to the unna boot\par

## 2022-12-08 NOTE — ASSESSMENT
[FreeTextEntry1] : Unna boot dressing applied. Continue weekly dressings. Continue treatment of CVI as scheduled. \par Return to office next Monday 12/12/22 for Right GSV RFA [Arterial/Venous Disease] : arterial/venous disease

## 2022-12-08 NOTE — HISTORY OF PRESENT ILLNESS
[FreeTextEntry1] : Mrs. Lui Hurst is a jammie 51 year old lady who presented with a 1 month history of left medial ankle wound. She reported a history of a wound in the same location, which occurred following injury in 2007. The wound took two years to heal completely. This was managed with Unna boots at the time. She had been applying Unna boots herself once/week. She denied any history of DVT or SVT. She reported cramping in the left leg which occurs after ambulating 1-2 blocks. She reported pain in the leg at night. She denied any history of right sided symptoms. She had previously worn compression stockings. She has not had any prior vascular surgical intervention on her lower extremities. Following her initial evaluation, we initiated twice weekly Unna boot dressings, which was then decreased to once weekly and she presents today for dressing change. During a recent visit there was erythema around the ulcer which had scabbed over. She denied any constitutional symptoms. I started her on a course of antibiotics, which she completed. The erythema has resolved. \par \par Interval history: New area appears to be forming blister just inferior to present ulcer. 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 Poe is doing well. Presents for left Unna boot dressing change. Pt states her left leg is feeling much better after having left leg GSV RFA (11/3/22) and left distal GSV Varithena (12/1/22). Pt states the size of her ulcer is getting smaller. No complaints today.

## 2022-12-12 ENCOUNTER — APPOINTMENT (OUTPATIENT)
Dept: VASCULAR SURGERY | Facility: CLINIC | Age: 52
End: 2022-12-12

## 2022-12-12 DIAGNOSIS — I83.892 VARICOSE VEINS OF LEFT LOWER EXTREMITY WITH OTHER COMPLICATIONS: ICD-10-CM

## 2022-12-12 PROCEDURE — 29580 STRAPPING UNNA BOOT: CPT | Mod: LT

## 2022-12-12 PROCEDURE — 36475 ENDOVENOUS RF 1ST VEIN: CPT | Mod: RT

## 2022-12-14 NOTE — PROCEDURE
[FreeTextEntry1] : right GSV RFA [FreeTextEntry3] : Procedural safety checklist and time out completed:\par Confirmed patient identification (Patient Name, , and/or medical record number including when possible affirmation by patient or parent/family/other).\par Confirmed procedure with the patient. Consent present, accurate and signed. \par Confirmed special equipment and supplies are present.\par Sterility confirmed. Position verified. \par Site/ side is marked and visible and confirmed. \par Procedure confirmed by consent. Accurate consent including side and site.\par Review of medical records, including venous ultrasound, noting correct procedure including site and side.\par MD/PA verifies presence and review of imaging studies and or written report of imaging studies.\par Agreement on the procedure to be performed\par Time out completed.\par All of the above has been confirmed by the team.\par All patient-specific concerns have been addressed. \par \par Indication: right lower extremity varicose veins with inflammation, leg pain, leg swelling, and leg cramping.  Venous insufficiency/ reflux.\par \par Procedure: radiofrequency ablation of the right great saphenous vein. \par 	\par Ms. DIALLO SAUCEDO is a 52 year old F with a history of right lower extremity varicose veins previously seen in the office.  Ultrasound examination demonstrated venous insufficiency. A trial of compression stockings, exercise, elevation, and pain medication was attempted without relief and definitive treatment with radiofrequency ablation was offered. \par \par The patient has come for radiofrequency ablation treatment of the right great saphenous vein.\par I have discussed the risks of the procedure at length with the patient. The risks discussed were inclusive of but not limited to infection, irritation at the site of infiltration of local anesthesia, and also rare risk of deep venous thrombosis and pulmonary emboli. The patient agrees to proceed with the procedure. \par The patient was escorted into the procedure room and a time out called.\par The entire limb was prepped and draped in sterile fashion. The RF fiber was placed on the sterile field and connected by a sterile cable. Actuation, temperature, and impedance testing were performed to ensure that all components were connected and operating properly. \par The patient was placed on the procedure table and local anesthesia was instilled in the skin overlying the access site. Under ultrasound guidance, the vein was punctured with a micropuncture needle, using the anterior wall technique. A guide wire was now introduced through the needle, and the needle was then exchanged over the guide wire for a 7F sheath. The guide wire was removed and the RF probe was then placed into the right great saphenous vein through the sheath and position confirmed using ultrasound guidance. After the RF probe position was verified by ultrasound, tumescent anesthesia consisting of normal saline, 1% lidocaine with 8.4% sodium bicarbonate was infiltrated, under ultrasound guidance, precisely into the perivenous compartment along the entire length of the vein until a “halo” of fluid was noted around the vein. After RF probe position was again confirmed with ultrasound imaging, RF energy was applied. The probe was gradually and carefully withdrawn at a rate of 6.5cm/20seconds. \par \par 7 cycles of RF performed using the 7 cm probe\par Total treatment time was 140 seconds.\par The total volume injected was 400 cc\par Treatment length was 39 cm and \par The probe is 3.6 cm from the SFJ.\par \par Estimated Blood Loss: minimal\par Repeat ultrasound of the treated vein was performed confirming successful treatment. The catheter and sheath were withdrawn and hemostasis established with direct pressure. After assuring hemostasis, a sterile 4x4 was placed on the access site and an ACE compression wrap was applied. Patient tolerated procedure well. Patient was given post-procedure instructions and follow up appointment was scheduled.\par \par Following right GSV RFA, left Unna boot dressing was also placed.\par

## 2022-12-19 ENCOUNTER — APPOINTMENT (OUTPATIENT)
Dept: VASCULAR SURGERY | Facility: CLINIC | Age: 52
End: 2022-12-19

## 2022-12-19 ENCOUNTER — APPOINTMENT (OUTPATIENT)
Dept: VASCULAR SURGERY | Facility: CLINIC | Age: 52
End: 2022-12-19
Payer: COMMERCIAL

## 2022-12-19 VITALS
BODY MASS INDEX: 42.21 KG/M2 | SYSTOLIC BLOOD PRESSURE: 126 MMHG | HEART RATE: 105 BPM | TEMPERATURE: 98 F | HEIGHT: 69 IN | DIASTOLIC BLOOD PRESSURE: 84 MMHG | WEIGHT: 285 LBS

## 2022-12-19 PROCEDURE — 93971 EXTREMITY STUDY: CPT | Mod: RT

## 2022-12-19 PROCEDURE — 29580 STRAPPING UNNA BOOT: CPT | Mod: LT

## 2022-12-19 PROCEDURE — 99212 OFFICE O/P EST SF 10 MIN: CPT | Mod: 25

## 2022-12-22 ENCOUNTER — APPOINTMENT (OUTPATIENT)
Dept: VASCULAR SURGERY | Facility: CLINIC | Age: 52
End: 2022-12-22

## 2022-12-22 VITALS
WEIGHT: 285 LBS | DIASTOLIC BLOOD PRESSURE: 78 MMHG | HEART RATE: 100 BPM | SYSTOLIC BLOOD PRESSURE: 137 MMHG | TEMPERATURE: 98.5 F | HEIGHT: 69 IN | BODY MASS INDEX: 42.21 KG/M2

## 2022-12-22 PROCEDURE — 29580 STRAPPING UNNA BOOT: CPT | Mod: LT

## 2022-12-22 PROCEDURE — 99212 OFFICE O/P EST SF 10 MIN: CPT | Mod: 25

## 2022-12-22 NOTE — PHYSICAL EXAM
[Normal Breath Sounds] : Normal breath sounds [Normal Heart Sounds] : normal heart sounds [2+] : left 2+ [de-identified] : NAD. Neurologically intact.  [de-identified] : WNL. [FreeTextEntry1] : Left medial leg ulcer decreased in size. No signs of infection.

## 2022-12-22 NOTE — PHYSICAL EXAM
[Normal Breath Sounds] : Normal breath sounds [Normal Heart Sounds] : normal heart sounds [2+] : left 2+ [de-identified] : NAD. Neurologically intact.  [de-identified] : WNL. [FreeTextEntry1] : Left medial leg ulcer is almost entirely healed.

## 2022-12-22 NOTE — HISTORY OF PRESENT ILLNESS
[FreeTextEntry1] : Mrs. Lui Hurst is a jammie 51 year old lady who presented with a 1 month history of left medial ankle wound. She reported a history of a wound in the same location, which occurred following injury in 2007. The wound took two years to heal completely. This was managed with Unna boots at the time. She had been applying Unna boots herself once/week. She denied any history of DVT or SVT. She reported cramping in the left leg which occurs after ambulating 1-2 blocks. She reported pain in the leg at night. She denied any history of right sided symptoms. She had previously worn compression stockings. She has not had any prior vascular surgical intervention on her lower extremities. Following her initial evaluation, we initiated twice weekly Unna boot dressings, which was then decreased to once weekly and she presents today for dressing change. During a recent visit there was erythema around the ulcer which had scabbed over. She denied any constitutional symptoms. I started her on a course of antibiotics, which she completed. The erythema has resolved. \par \par Interval history: New area appears to be forming blister just inferior to present ulcer. 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 Poe returns for LLE Unna boot dressing change.

## 2022-12-22 NOTE — ASSESSMENT
[FreeTextEntry1] : In summary, Mrs. Poe presents s/p RLE GSV RFA. Duplex findings were reviewed with patient. LLE Unna boot was placed. She will return for dressing change on 12/22/22.

## 2022-12-22 NOTE — HISTORY OF PRESENT ILLNESS
[FreeTextEntry1] : Mrs. Lui Hurst is a jammie 51 year old lady who presented with a 1 month history of left medial ankle wound. She reported a history of a wound in the same location, which occurred following injury in 2007. The wound took two years to heal completely. This was managed with Unna boots at the time. She had been applying Unna boots herself once/week. She denied any history of DVT or SVT. She reported cramping in the left leg which occurs after ambulating 1-2 blocks. She reported pain in the leg at night. She denied any history of right sided symptoms. She had previously worn compression stockings. She has not had any prior vascular surgical intervention on her lower extremities. Following her initial evaluation, we initiated twice weekly Unna boot dressings, which was then decreased to once weekly and she presents today for dressing change. During a recent visit there was erythema around the ulcer which had scabbed over. She denied any constitutional symptoms. I started her on a course of antibiotics, which she completed. The erythema has resolved. \par \par Interval history: New area appears to be forming blister just inferior to present ulcer. 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 Poe presents for Unna boot dressing change. Wound is healing well. Post ablation RLE venous duplex also performed today and shows the right GSV is closed and there is no evidence of DVT.

## 2022-12-30 ENCOUNTER — APPOINTMENT (OUTPATIENT)
Dept: VASCULAR SURGERY | Facility: CLINIC | Age: 52
End: 2022-12-30
Payer: COMMERCIAL

## 2022-12-30 VITALS — TEMPERATURE: 98.6 F | HEART RATE: 102 BPM | DIASTOLIC BLOOD PRESSURE: 85 MMHG | SYSTOLIC BLOOD PRESSURE: 128 MMHG

## 2022-12-30 PROCEDURE — 99213 OFFICE O/P EST LOW 20 MIN: CPT

## 2022-12-30 NOTE — PHYSICAL EXAM
[Normal Breath Sounds] : Normal breath sounds [Normal Heart Sounds] : normal heart sounds [2+] : left 2+ [de-identified] : NAD. Neurologically intact.  [de-identified] : WNL. [FreeTextEntry1] : Left medial leg 2mm scab. No open wounds. No signs of infection.

## 2022-12-30 NOTE — HISTORY OF PRESENT ILLNESS
[FreeTextEntry1] : Mrs. Lui Hurst is a jammie 51 year old lady who presented with a 1 month history of left medial ankle wound. She reported a history of a wound in the same location, which occurred following injury in 2007. The wound took two years to heal completely. This was managed with Unna boots at the time. She had been applying Unna boots herself once/week. She denied any history of DVT or SVT. She reported cramping in the left leg which occurs after ambulating 1-2 blocks. She reported pain in the leg at night. She denied any history of right sided symptoms. She had previously worn compression stockings. She has not had any prior vascular surgical intervention on her lower extremities. Following her initial evaluation, we initiated twice weekly Unna boot dressings, which was then decreased to once weekly and she presents today for dressing change. During a recent visit there was erythema around the ulcer which had scabbed over. She denied any constitutional symptoms. I started her on a course of antibiotics, which she completed. The erythema has resolved. \par \par Interval history: New area appears to be forming blister just inferior to present ulcer. 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 Poe reports that she removed her Unna boot last week due to irritation. She reports significant allergy problems at baseline and uses her epi pen frequently. She reports there is a small 2mm scab where the medial left leg ulcer was but this is not draining.

## 2022-12-30 NOTE — ASSESSMENT
[FreeTextEntry1] : In summary, Mrs. Poe p/w a history of LE CVI now s/p treatment. We will discontinue Unna boot therapy at this time. I instructed her to wear her compression stockings daily. Telfa was provided to patient to apply to scab under compression stockings until scab completely healed. She will follow up as needed.

## 2023-01-25 ENCOUNTER — APPOINTMENT (OUTPATIENT)
Dept: VASCULAR SURGERY | Facility: CLINIC | Age: 53
End: 2023-01-25
Payer: COMMERCIAL

## 2023-01-25 PROCEDURE — 99442: CPT

## 2023-01-26 NOTE — HISTORY OF PRESENT ILLNESS
[FreeTextEntry1] : Mrs. Lui Hurst is a jammie 51 year old lady who presented with a 1 month history of left medial ankle wound. She reported a history of a wound in the same location, which occurred following injury in 2007. The wound took two years to heal completely. This was managed with Unna boots at the time. She had been applying Unna boots herself once/week. She denied any history of DVT or SVT. She reported cramping in the left leg which occurs after ambulating 1-2 blocks. She reported pain in the leg at night. She denied any history of right sided symptoms. She had previously worn compression stockings. She has not had any prior vascular surgical intervention on her lower extremities. Following her initial evaluation, we initiated twice weekly Unna boot dressings, which was then decreased to once weekly and she presents today for dressing change. During a recent visit there was erythema around the ulcer which had scabbed over. She denied any constitutional symptoms. I started her on a course of antibiotics, which she completed. The erythema has resolved. \par \par Interval history: New area appears to be forming blister just inferior to present ulcer. 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 Lui Hurst is now s/p left GSV RFA (11/03/22), left distal GSV Varithena UGFS (12/1/22) and right GSV RFA 12/12/22. Post-procedure duplexes showed that the respective veins were closed and there was no evidence of DVT. She is doing quite well and her left medial ankle ulcer has finally healed! She denies any new wounds and is wearing her compression stockings as instructed. She denies any access site complications and is otherwise without complaints.

## 2023-01-26 NOTE — ASSESSMENT
[FreeTextEntry1] : In summary, Mrs. Poe presents s/p treatment of BLE CVI. She is doing quite well and denies any new ulcers or edema. I instructed her to continue to wear compression stockings daily and to contact me immediately if any new wounds develop. She will otherwise follow up as needed.

## 2023-02-02 ENCOUNTER — APPOINTMENT (OUTPATIENT)
Dept: MAMMOGRAPHY | Facility: IMAGING CENTER | Age: 53
End: 2023-02-02
Payer: COMMERCIAL

## 2023-02-02 ENCOUNTER — OUTPATIENT (OUTPATIENT)
Dept: OUTPATIENT SERVICES | Facility: HOSPITAL | Age: 53
LOS: 1 days | End: 2023-02-02
Payer: COMMERCIAL

## 2023-02-02 DIAGNOSIS — Z00.8 ENCOUNTER FOR OTHER GENERAL EXAMINATION: ICD-10-CM

## 2023-02-02 PROCEDURE — 77063 BREAST TOMOSYNTHESIS BI: CPT | Mod: 26

## 2023-02-02 PROCEDURE — 77067 SCR MAMMO BI INCL CAD: CPT | Mod: 26

## 2023-02-02 PROCEDURE — 77067 SCR MAMMO BI INCL CAD: CPT

## 2023-02-02 PROCEDURE — 77063 BREAST TOMOSYNTHESIS BI: CPT

## 2024-01-17 ENCOUNTER — APPOINTMENT (OUTPATIENT)
Dept: OPHTHALMOLOGY | Facility: CLINIC | Age: 54
End: 2024-01-17

## 2024-05-08 ENCOUNTER — APPOINTMENT (OUTPATIENT)
Dept: OPHTHALMOLOGY | Facility: CLINIC | Age: 54
End: 2024-05-08

## 2025-06-07 ENCOUNTER — APPOINTMENT (OUTPATIENT)
Dept: MAMMOGRAPHY | Facility: IMAGING CENTER | Age: 55
End: 2025-06-07